# Patient Record
Sex: FEMALE
[De-identification: names, ages, dates, MRNs, and addresses within clinical notes are randomized per-mention and may not be internally consistent; named-entity substitution may affect disease eponyms.]

---

## 2017-06-30 ENCOUNTER — CHARTING TRANS (OUTPATIENT)
Dept: OTHER | Age: 27
End: 2017-06-30

## 2017-06-30 ENCOUNTER — LAB SERVICES (OUTPATIENT)
Dept: OTHER | Age: 27
End: 2017-06-30

## 2017-06-30 LAB
APPEARANCE: CLEAR
BILIRUBIN: NORMAL
COLOR: YELLOW
GLUCOSE U: NORMAL
KETONES: NORMAL
LEUKOCYTES: NORMAL
NITRITE: NORMAL
OCCULT BLOOD: NORMAL
PH: 7.5
PROTEIN: NORMAL
URINE SPEC GRAVITY: 1
UROBILINOGEN: 0.2

## 2017-07-06 LAB — BACTERIA UR CULT: NORMAL

## 2018-07-03 PROCEDURE — 36415 COLL VENOUS BLD VENIPUNCTURE: CPT | Performed by: PHYSICIAN ASSISTANT

## 2018-07-03 PROCEDURE — 86787 VARICELLA-ZOSTER ANTIBODY: CPT | Performed by: PHYSICIAN ASSISTANT

## 2018-07-03 PROCEDURE — 86480 TB TEST CELL IMMUN MEASURE: CPT | Performed by: PHYSICIAN ASSISTANT

## 2018-11-03 VITALS
TEMPERATURE: 98.2 F | OXYGEN SATURATION: 98 % | HEART RATE: 80 BPM | RESPIRATION RATE: 18 BRPM | DIASTOLIC BLOOD PRESSURE: 68 MMHG | SYSTOLIC BLOOD PRESSURE: 110 MMHG

## 2024-02-28 ENCOUNTER — OFFICE VISIT (OUTPATIENT)
Dept: OBGYN CLINIC | Facility: CLINIC | Age: 34
End: 2024-02-28
Payer: COMMERCIAL

## 2024-02-28 VITALS
SYSTOLIC BLOOD PRESSURE: 110 MMHG | BODY MASS INDEX: 20.56 KG/M2 | WEIGHT: 131 LBS | DIASTOLIC BLOOD PRESSURE: 68 MMHG | HEIGHT: 67 IN

## 2024-02-28 DIAGNOSIS — Z34.90 EARLY STAGE OF PREGNANCY (HCC): Primary | ICD-10-CM

## 2024-02-28 LAB
CONTROL LINE PRESENT WITH A CLEAR BACKGROUND (YES/NO): YES YES/NO
KIT LOT #: NORMAL NUMERIC
PREGNANCY TEST, URINE: YES

## 2024-02-28 PROCEDURE — 99203 OFFICE O/P NEW LOW 30 MIN: CPT | Performed by: OBSTETRICS & GYNECOLOGY

## 2024-02-28 RX ORDER — LISDEXAMFETAMINE DIMESYLATE 20 MG/1
CAPSULE ORAL
COMMUNITY
Start: 2024-02-06

## 2024-03-06 ENCOUNTER — PATIENT MESSAGE (OUTPATIENT)
Dept: OBGYN CLINIC | Facility: CLINIC | Age: 34
End: 2024-03-06

## 2024-03-07 NOTE — TELEPHONE ENCOUNTER
From: Pastora Erickson  To: Oxana Lindsay  Sent: 3/6/2024 7:54 PM CST  Subject: Doctors Note for International Travel    Hi Dr. Lindsay (and team) ,     Thank you so much for meeting with me last week. It was so jonathon to have someone chat me through the early pregnancy jitters.     I was hoping you could help me with something. We had international travel scheduled for late July, but we no longer feel comfortable going since we'll be in the first week of the third trimester. The airline will give us a full refund with a doctor's note. I know this probably seems like typical first-time parent anxiety, but I was hoping you'd be willing to write a doctor's note on our behalf recommending we don't fly.    We completely understand if you're unable or unwilling to write the note!    Thank you and talk soon(nell),     Pastora Erickson and Humberto Rock

## 2024-03-15 ENCOUNTER — ROUTINE PRENATAL (OUTPATIENT)
Dept: OBGYN CLINIC | Facility: CLINIC | Age: 34
End: 2024-03-15
Payer: COMMERCIAL

## 2024-03-15 VITALS
WEIGHT: 133 LBS | HEIGHT: 67 IN | BODY MASS INDEX: 20.88 KG/M2 | SYSTOLIC BLOOD PRESSURE: 98 MMHG | DIASTOLIC BLOOD PRESSURE: 58 MMHG

## 2024-03-15 DIAGNOSIS — N91.1 SECONDARY AMENORRHEA: Primary | ICD-10-CM

## 2024-03-15 DIAGNOSIS — Z32.01 PREGNANCY EXAMINATION OR TEST, POSITIVE RESULT (HCC): ICD-10-CM

## 2024-03-15 PROCEDURE — 99214 OFFICE O/P EST MOD 30 MIN: CPT | Performed by: OBSTETRICS & GYNECOLOGY

## 2024-03-15 NOTE — PATIENT INSTRUCTIONS
Adapting to Pregnancy: First Trimester    As your body adjusts, you may have to change or limit your daily activities. You’ll need more rest. You may also need to use the energy you have more wisely.  Your changing body  Almost every part of your body is affected as you adapt to pregnancy. The uterus and cervix will begin to soften right away. You may not look very pregnant during the first 3 months. But you are likely to have some common signs of early pregnancy:  Nausea  Fatigue  Frequent urination  Mood swings  Bloating of the belly  Constipation  Heartburn  Missed or light periods (first trimester bleeding)  Nipple or breast tenderness and breast swelling  It’s not too late to start good habits  What matters most is protecting your baby from this moment on. If you smoke, drink alcohol, or use drugs, now is the time to stop. If you need help, talk with your healthcare provider:  Smoking increases the risk of stillbirth or having a low-birth-weight baby. If you smoke, quit now.  Alcohol and drugs have been linked with miscarriage, birth defects, intellectual disability, and low birth weight. Do not drink alcohol or take drugs.  Tips to relieve nausea  Although nausea can happen at any time of the day, it may be worse in the morning. To help prevent nausea:  Eat small, light meals at frequent intervals.  Drink fluids often.  Get up slowly. Eat a few unsalted crackers before you get out of bed.  Avoid smells that bother you.  Avoid spicy and fatty foods.  Eat an ice pop in your favorite flavor.  Get plenty of rest.  You can start taking karime or mint in all forms or vitamin B6 (maximum 200 mg throughout the day) and Unisom (12.5-25 mg) nightly for nausea and/or vomiting.  Talk with your healthcare provider if you take vitamins that upset your stomach.    Work concerns  The end of the first trimester is a good time to discuss working during pregnancy with your employer. Follow your healthcare provider’s advice if  your job needs you to stand for a long time, work with hazardous tools, or even sit at a desk all day. Your workspace, workload, or scheduled hours may need to be adjusted. Perhaps you can change body postures more often or take an extra break. It is also acceptable to work throughout your pregnancy until delivery.   Advice for travel  Talk to your healthcare provider first, but the second trimester may be the best time for any travel. You may be advised to avoid certain trips while you’re pregnant. Food and water can be concerns in developing countries. Travel by car is a good choice, as you can stop, get out, and stretch. Bring snacks and water along. Fasten the lap belt below your belly, low over your hips. Also be sure to wear the shoulder harness.  Intimacy  Unless your healthcare provider tells you to, there is no reason to stop having sex while you’re pregnant. You or your partner may notice changes in desire. Desire may be less in the first trimester, due to nausea and fatigue. In the second trimester, sex may be very enjoyable. The third trimester can be a challenge comfort-wise. Try different positions and see what’s best for you both.  [employer] last reviewed this educational content on 10/1/2017  © 7666-5044 [employer] [address] [facility], [place] [address]. All rights reserved. This information is not intended as a substitute for professional medical care. Always follow your healthcare professional's instructions.

## 2024-03-15 NOTE — PROGRESS NOTES
[facility]  Obstetrics and Gynecology    Subjective:     Pastora Erickson is a 33 year old  female presents with c/o secondary amenorrhea and positive pregnancy test. The patient was recommended to return for further evaluation. The patient reports some nausea and fatigue. No vomiting. Not using anything yet. Unplanned desired pregnancy.       Patient's last menstrual period was 2024 (approximate).    Recommend against travel internationally in the early 3rd trimester. Letter provided.     Review of Systems:  General: no complaints  Eyes: no complaints  Respiratory: no complaints  Cardiovascular: no complaints  GI: no complaints  : See HPI  Hematological/lymphatic: no complaints  Breast: no complaints  Psychiatric: no complaints  Endocrine:no complaints  Neurological: no complaints  Immunological: no complaints  Musculoskeletal:no complaints    Past Medical History:   Diagnosis Date    ADHD        Past Surgical History:   Procedure Laterality Date    Other surgical history  2016    egg retrival as an egg donor       Social History     Socioeconomic History    Marital status: Single     Spouse name: Not on file    Number of children: Not on file    Years of education: Not on file    Highest education level: Not on file   Occupational History    Not on file   Tobacco Use    Smoking status: Never    Smokeless tobacco: Never   Substance and Sexual Activity    Alcohol use: Not Currently     Comment: cocktail/wine everyday    Drug use: Not Currently     Types: Cannabis    Sexual activity: Yes     Partners: Male     Birth control/protection: Condom   Other Topics Concern     Service Not Asked    Blood Transfusions No    Caffeine Concern Not Asked    Occupational Exposure Not Asked    Hobby Hazards Not Asked    Sleep Concern Not Asked    Stress Concern Not Asked    Weight Concern Not Asked    Special Diet Not Asked    Back Care Not Asked    Exercise Not Asked    Bike Helmet Not Asked     Seat Belt Not Asked    Self-Exams Not Asked   Social History Narrative    Not on file     Social Determinants of Health     Financial Resource Strain: Not on file   Food Insecurity: Not on file   Transportation Needs: Not on file   Stress: Not on file   Housing Stability: Not on file       Family History   Problem Relation Age of Onset    No Known Problems Father     No Known Problems Mother     Stroke Maternal Grandmother     Cancer Maternal Grandmother         unsure of type    Stroke Maternal Grandfather     Cancer Maternal Cousin Female         leukemia    No Known Problems Sister     ADHD Sister     ADHD Brother        Objective:     Vitals:    03/15/24 1508   BP: 98/58   Weight: 133 lb (60.3 kg)   Height: 67\"         Body mass index is 20.83 kg/m².    Exam:   GENERAL: well developed, well nourished, in no apparent distress, alert and orientated X 3  PSYCH: mood and affect stable   SKIN: no rashes, no lesions  LUNGS: respiration unlabored  CARDIOVASCULAR: no peripheral edema or varicosities, skin warm and dry  ABDOMEN: Soft, non distended; non tender.   EXTREMITIES:  Normal range of motion, strength 5/5 walking.     Labs:  POC urine pregnancy test 24:   Positive     Imaging:  Bedside transabdominal ultrasound unable to visualize uterus. Recommend follow up HCG testing and ultrasound when HCG>2000.        Assessment:     Pastora Erickson is a 33 year old  female who presents for secondary amenorrhea and positive pregnancy test    There is no problem list on file for this patient.        Plan:     Secondary amenorrhea  - a/w positive pregnancy test  - US previously ordered. Recommend checking HCG today and in 48 hours. May need to reschedule ultrasound if low HCG.   - HCG ordered for today with repeat .   - Type and screen ordered   - CBC ordered     Patient counseling via instructions   - Pt counseled on safety, diet, exercise, caffiene, tobacco, ETOH, sexual activity, ER precautions, diet,  exercise, appropriate otc medication use, substance abuse   - Counseled on taking a PNV with 0.4mg of folic acid    - advised to follow up to establish prenatal care   - SAB precautions reviewed.    - Nausea and vomiting in pregnancy including vitamin B 6 and unisom reviewed in after visit instructions.     All of the findings and plan were discussed with the patient.  She notes understanding and agrees with the plan of care.  All questions were answered to the best of my ability at this time.    RTC for RN education visit when 10 weeks, New OB visit when 12 weeks or sooner if needed     Dr. Sony Abdi MD    [facility]     This note was created by [employer] voice recognition. Errors in content may be related to improper recognition by the system; efforts to review and correct have been done but errors may still exist. Please be advised the primary purpose of this note is for me to communicate medical care. Standard sentence structure is not always used. Medical terminology and medical abbreviations may be used. There may be grammatical, typographical, and automated fill ins that may have errors missed in proofreading.

## 2024-03-18 ENCOUNTER — ULTRASOUND ENCOUNTER (OUTPATIENT)
Dept: OBGYN CLINIC | Facility: CLINIC | Age: 34
End: 2024-03-18
Payer: COMMERCIAL

## 2024-03-18 DIAGNOSIS — Z34.01 ENCOUNTER FOR SUPERVISION OF NORMAL FIRST PREGNANCY IN FIRST TRIMESTER (HCC): Primary | ICD-10-CM

## 2024-03-20 ENCOUNTER — TELEPHONE (OUTPATIENT)
Dept: OBGYN CLINIC | Facility: CLINIC | Age: 34
End: 2024-03-20
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
## 2024-03-28 ENCOUNTER — TELEPHONE (OUTPATIENT)
Dept: OBGYN CLINIC | Facility: CLINIC | Age: 34
End: 2024-03-28

## 2024-03-28 NOTE — TELEPHONE ENCOUNTER
Patient is calling requesting to speak to RN or Doctor, regarding roller coasters, per patient is on a family trip and wants to know there in-put about roller coaster in her first trimester of pregnancy. Please follow up with patient.

## 2024-04-09 ENCOUNTER — NURSE ONLY (OUTPATIENT)
Dept: OBGYN CLINIC | Facility: CLINIC | Age: 34
End: 2024-04-09
Payer: COMMERCIAL

## 2024-04-09 ENCOUNTER — LAB ENCOUNTER (OUTPATIENT)
Dept: LAB | Facility: REFERENCE LAB | Age: 34
End: 2024-04-09
Attending: INTERNAL MEDICINE
Payer: COMMERCIAL

## 2024-04-09 DIAGNOSIS — Z34.01 ENCOUNTER FOR SUPERVISION OF NORMAL FIRST PREGNANCY IN FIRST TRIMESTER (HCC): Primary | ICD-10-CM

## 2024-04-09 DIAGNOSIS — Z34.01 ENCOUNTER FOR SUPERVISION OF NORMAL FIRST PREGNANCY IN FIRST TRIMESTER (HCC): ICD-10-CM

## 2024-04-09 LAB
ANTIBODY SCREEN: NEGATIVE
BASOPHILS # BLD AUTO: 0.05 X10(3) UL (ref 0–0.2)
BASOPHILS NFR BLD AUTO: 0.4 %
DEPRECATED RDW RBC AUTO: 40.5 FL (ref 35.1–46.3)
EOSINOPHIL # BLD AUTO: 0.12 X10(3) UL (ref 0–0.7)
EOSINOPHIL NFR BLD AUTO: 1 %
ERYTHROCYTE [DISTWIDTH] IN BLOOD BY AUTOMATED COUNT: 12.2 % (ref 11–15)
HBV SURFACE AG SER-ACNC: 0.12 [IU]/L
HBV SURFACE AG SERPL QL IA: NONREACTIVE
HCT VFR BLD AUTO: 37.1 %
HCV AB SERPL QL IA: NONREACTIVE
HGB BLD-MCNC: 13.7 G/DL
IMM GRANULOCYTES # BLD AUTO: 0.04 X10(3) UL (ref 0–1)
IMM GRANULOCYTES NFR BLD: 0.3 %
LYMPHOCYTES # BLD AUTO: 2.43 X10(3) UL (ref 1–4)
LYMPHOCYTES NFR BLD AUTO: 19.9 %
MCH RBC QN AUTO: 33.4 PG (ref 26–34)
MCHC RBC AUTO-ENTMCNC: 36.9 G/DL (ref 31–37)
MCV RBC AUTO: 90.5 FL
MONOCYTES # BLD AUTO: 0.81 X10(3) UL (ref 0.1–1)
MONOCYTES NFR BLD AUTO: 6.6 %
NEUTROPHILS # BLD AUTO: 8.77 X10 (3) UL (ref 1.5–7.7)
NEUTROPHILS # BLD AUTO: 8.77 X10(3) UL (ref 1.5–7.7)
NEUTROPHILS NFR BLD AUTO: 71.8 %
PLATELET # BLD AUTO: 215 10(3)UL (ref 150–450)
RBC # BLD AUTO: 4.1 X10(6)UL
RH BLOOD TYPE: POSITIVE
RUBV IGG SER QL: POSITIVE
RUBV IGG SER-ACNC: 35.4 IU/ML (ref 10–?)
T PALLIDUM AB SER QL IA: NONREACTIVE
WBC # BLD AUTO: 12.2 X10(3) UL (ref 4–11)

## 2024-04-09 PROCEDURE — 86803 HEPATITIS C AB TEST: CPT

## 2024-04-09 PROCEDURE — 86850 RBC ANTIBODY SCREEN: CPT

## 2024-04-09 PROCEDURE — 87340 HEPATITIS B SURFACE AG IA: CPT

## 2024-04-09 PROCEDURE — 83021 HEMOGLOBIN CHROMOTOGRAPHY: CPT

## 2024-04-09 PROCEDURE — 86780 TREPONEMA PALLIDUM: CPT

## 2024-04-09 PROCEDURE — 83020 HEMOGLOBIN ELECTROPHORESIS: CPT

## 2024-04-09 PROCEDURE — 87389 HIV-1 AG W/HIV-1&-2 AB AG IA: CPT

## 2024-04-09 PROCEDURE — 86762 RUBELLA ANTIBODY: CPT

## 2024-04-09 PROCEDURE — 86901 BLOOD TYPING SEROLOGIC RH(D): CPT

## 2024-04-09 PROCEDURE — 87086 URINE CULTURE/COLONY COUNT: CPT

## 2024-04-09 PROCEDURE — 85025 COMPLETE CBC W/AUTO DIFF WBC: CPT

## 2024-04-09 PROCEDURE — 86900 BLOOD TYPING SEROLOGIC ABO: CPT

## 2024-04-09 PROCEDURE — 36415 COLL VENOUS BLD VENIPUNCTURE: CPT

## 2024-04-09 NOTE — PROGRESS NOTES
Pt is a   here today for RN OB Education.       Pregnancy Confirmation apt with:  with RD    LMP:     US: 3/18 (8w2d)    Working MARIA DE JESUS: 10/22    Pre  BMI: 19.89    Medical Hx significant for: ADHD    Obstetrical Hx significant for: NA    Surgical Hx significant for: egg retrieval (egg donor)    EPDS score: 5    Early GTT screening: does not meet criteria    Preeclampsia prevention screening: does not meet criteria.     OUD Screening: Patient has answered NO to 5p questions and has no  risk factors.     Patient given \"What Pregnant Women Need to Know\" handout.     Educational material reviewed with patient: Prenatal care, nutrition, weight gain recommendations, travel, exercise, intercourse, pregnancy changes, safe medications, pregnancy and work, fetal movement, labor and  labor, warning signs, food safety, tdap, cord blood, breastfeeding, circumcision, and Group B strep.     Pt agrees to blood transfusion if needed: Yes    PN labs ordered: Yes    Optional genetic screening discussed.  Pt desires foresight and prequel.    [facility] Media Policy: Reviewed and verbalized understanding.     NOB appt:  with [facility] (earliest available that worked with pt's skdevan)    Lab appt:

## 2024-04-10 LAB
HGB A2 MFR BLD: 2.7 % (ref 1.5–3.5)
HGB PNL BLD ELPH: 97.3 % (ref 95.5–100)

## 2024-04-16 ENCOUNTER — TELEPHONE (OUTPATIENT)
Dept: OBGYN CLINIC | Facility: CLINIC | Age: 34
End: 2024-04-16

## 2024-04-16 PROBLEM — Z33.1 LOW RISK PREGNANCY, INCIDENTAL (HCC): Status: ACTIVE | Noted: 2024-04-16

## 2024-04-16 NOTE — TELEPHONE ENCOUNTER
RN spoke with pt, verified name and . RN provided pt with neg/normal Prequel results and female gender. Pt verbalized understanding and agreed with POC.

## 2024-04-22 ENCOUNTER — TELEPHONE (OUTPATIENT)
Dept: OBGYN CLINIC | Facility: CLINIC | Age: 34
End: 2024-04-22

## 2024-04-22 ENCOUNTER — INITIAL PRENATAL (OUTPATIENT)
Dept: OBGYN CLINIC | Facility: CLINIC | Age: 34
End: 2024-04-22
Payer: COMMERCIAL

## 2024-04-22 VITALS
SYSTOLIC BLOOD PRESSURE: 108 MMHG | DIASTOLIC BLOOD PRESSURE: 60 MMHG | BODY MASS INDEX: 21.77 KG/M2 | WEIGHT: 138.69 LBS | HEART RATE: 96 BPM | HEIGHT: 67 IN

## 2024-04-22 DIAGNOSIS — Z34.02 ENCOUNTER FOR SUPERVISION OF NORMAL FIRST PREGNANCY IN SECOND TRIMESTER (HCC): Primary | ICD-10-CM

## 2024-04-22 PROBLEM — Z14.8 GENETIC CARRIER STATUS: Status: ACTIVE | Noted: 2024-04-22

## 2024-04-22 NOTE — PROGRESS NOTES
[facility]  Obstetrics and Gynecology  History & Physical    CC: Patient is here to establish prenatal care     Subjective:     HPI:  Pastoar Erickson is a 33 year old  female at 13w6d who presents today to establish prenatal care. Patient reports no nausea or vomiting. No cramping or bleeding.     LMP: Patient's last menstrual period was 2024 (approximate).  MARIA DE JESUS:  Estimated Date of Delivery: 10/22/24    OB:  OB History    Para Term  AB Living   1 0 0 0 0 0   SAB IAB Ectopic Multiple Live Births   0 0 0 0        # Outcome Date GA Lbr Mike/2nd Weight Sex Type Anes PTL Lv   1 Current                GYN:   Pap hx- Last Pap 23: NILM.    PMH:   Past Medical History:    ADHD       PSH:    Past Surgical History:   Procedure Laterality Date    Other surgical history      egg retrival as an egg donor       MEDS:  Current Outpatient Medications on File Prior to Visit   Medication Sig Dispense Refill    VYVANSE 20 MG Oral Cap       Prenatal Vit-Fe Fumarate-FA (PRENATAL 19 OR) Take by mouth.      Multiple Vitamins-Minerals (MULTIVITAMIN ADULT OR) Take by mouth.       No current facility-administered medications on file prior to visit.       Allergies:    Allergies   Allergen Reactions    Penicillins RASH     rash     Immunizations:  Immunization History   Administered Date(s) Administered    DTAP 10/06/1992, 1992, 1993, 1997, 1999    HEP B 1997, 10/30/2007, 2008    HPV (Gardasil) 10/12/2006, 10/30/2007, 2008    Hep A, Adult 2018    IPV 10/06/1992, 1992, 1994, 1997    MMR 1994, 2007    Meningococcal-Menactra 2009    TDAP 2007, 2018       Family Hx:      Family History   Problem Relation Age of Onset    No Known Problems Father     No Known Problems Mother     Cancer Maternal Grandmother         unsure of type    Stroke Maternal Grandfather     No Known Problems Paternal Grandmother      No Known Problems Paternal Grandfather     No Known Problems Sister     ADHD Sister     ADHD Brother     Cancer Maternal Cousin Female         leukemia       SocialHx:        Social History     Socioeconomic History    Marital status: Single   Tobacco Use    Smoking status: Never    Smokeless tobacco: Never   Substance and Sexual Activity    Alcohol use: Not Currently     Comment: cocktail/wine everyday    Drug use: Not Currently     Types: Cannabis    Sexual activity: Yes     Partners: Male     Birth control/protection: Condom   Other Topics Concern    Blood Transfusions No     Social Determinants of Health     Financial Resource Strain: Low Risk  (4/8/2024)    Financial Resource Strain     Difficulty of Paying Living Expenses: Somewhat hard     Med Affordability: No   Transportation Needs: Unmet Transportation Needs (4/8/2024)    Transportation Needs     Lack of Transportation: Yes   Stress: No Stress Concern Present (4/8/2024)    Stress     Feeling of Stress : No   Housing Stability: Low Risk  (4/8/2024)    Housing Stability     Housing Instability: No   Review of Systems:  General: no complaints  Eyes: no complaints  Respiratory: no complaints  Cardiovascular: no complaints  GI: no complaints  : See HPI  Hematological/lymphatic: no complaints  Breast: no complaints  Psychiatric: no complaints  Endocrine:no complaints  Neurological: no complaints  Immunological: no complaints  Musculoskeletal:no complaints    Objective:     Vitals:    04/22/24 1341   BP: 108/60   Pulse: 96   Weight: 138 lb 11.2 oz (62.9 kg)   Height: 67\"        Exam:   GENERAL: well developed, well nourished, in no apparent distress, alert and orientated X 3  PSYCH: mood and affect stable   SKIN: no rashes, no lesions  HEENT: normal  LUNGS: respiration unlabored  CARDIOVASCULAR: no peripheral edema or varicosities, skin warm and dry  ABDOMEN: Soft, non distended; non tender.  EXTREMITIES:  Normal range of motion, strength 5/5 walking,  nontender without edema    Fetal Well Being Assessment:    Bedside doppler Mode 's.    Assessment/Plan:     Pastora Erickson is a 33 year old  female at 13w6d who presents today to establish prenatal care.    Patient Active Problem List   Diagnosis    Low-risk cell-free DNA, female ()    Genetic carrier status         Prenatal care  - prenatal labs ordered  - Pap: up to date.   - continue PNV daily  - PAULETTE at 15 weeks for MSAFP testing.     Genetic Screening tests  - Discussion held with patient about genetic screening: Cell free DNA and amniocentesis.  - Low risk cell free DNA baby girl. Patient aware.   - Myriad Carrier screening including Cystic fibrosis, SMA, and hemoglobinopathies: positive. Recommend partner testing. Patient agreed.        Patient education  - Pt counseled on safety, diet, exercise, caffiene, tobacco, ETOH, sexual activity, ER precautions, diet, exercise, appropriate weight gain, travel, appropriate otc medication use, substance abuse and pets.  - Counseled on taking a PNV with 0.4mg of folic acid   - Limiting intake of alcohol, coffee, tea, soda, and other foods containing caffeine  - Limit intake of fish to twice weekly to limit mercury exposure    RTC in 2 weeks     Dr. Sony Abdi MD    [facility]     This note was created by [employer] voice recognition. Errors in content may be related to improper recognition by the system; efforts to review and correct have been done but errors may still exist. Please be advised the primary purpose of this note is for me to communicate medical care. Standard sentence structure is not always used. Medical terminology and medical abbreviations may be used. There may be grammatical, typographical, and automated fill ins that may have errors missed in proofreading.

## 2024-04-22 NOTE — TELEPHONE ENCOUNTER
+ Hereditary Fructose Intolerance    Called pt informed, needing partner testing and report sent to pt's email via [email or web address].  Pt agrees.

## 2024-05-01 ENCOUNTER — TELEPHONE (OUTPATIENT)
Dept: OBGYN CLINIC | Facility: CLINIC | Age: 34
End: 2024-05-01

## 2024-05-01 NOTE — TELEPHONE ENCOUNTER
Patient states she has been having upper abdomen pain accompanied by a headache that started around 4am this morning. Would like to speak to a nurse.

## 2024-05-01 NOTE — TELEPHONE ENCOUNTER
Called pt c/o headache does not go away and upper abd pain. Reviewed chart Pt at 15.1 weeks, BP normal.  Per pt took one tylenol and strength unknown.  Informed pt may take two tablets of regular every 6 hours and 2 tabs of extra strength eduar 8 hours do not exceed 3,000 mg in 24 hours.      C/o dull to sharp pain across abd with walking sharper pain, did pass a bit of gas this am, + BM, PS 5-6/10 with walking but when lays down gets better, denies fever.  Informed pt to rest, can take Gas X and call back if getting worse with abd and headache.  Pt agrees.

## 2024-05-08 ENCOUNTER — ROUTINE PRENATAL (OUTPATIENT)
Dept: OBGYN CLINIC | Facility: CLINIC | Age: 34
End: 2024-05-08
Payer: COMMERCIAL

## 2024-05-08 ENCOUNTER — LAB ENCOUNTER (OUTPATIENT)
Dept: LAB | Facility: REFERENCE LAB | Age: 34
End: 2024-05-08
Attending: OBSTETRICS & GYNECOLOGY
Payer: COMMERCIAL

## 2024-05-08 VITALS
BODY MASS INDEX: 21.77 KG/M2 | HEIGHT: 67 IN | WEIGHT: 138.69 LBS | SYSTOLIC BLOOD PRESSURE: 104 MMHG | DIASTOLIC BLOOD PRESSURE: 62 MMHG

## 2024-05-08 DIAGNOSIS — Z34.02 ENCOUNTER FOR SUPERVISION OF NORMAL FIRST PREGNANCY IN SECOND TRIMESTER (HCC): ICD-10-CM

## 2024-05-08 DIAGNOSIS — Z34.02 ENCOUNTER FOR SUPERVISION OF NORMAL FIRST PREGNANCY IN SECOND TRIMESTER (HCC): Primary | ICD-10-CM

## 2024-05-08 PROCEDURE — 82105 ALPHA-FETOPROTEIN SERUM: CPT

## 2024-05-08 PROCEDURE — 36415 COLL VENOUS BLD VENIPUNCTURE: CPT

## 2024-05-08 NOTE — PROGRESS NOTES
34 yo  at 16 W  AFP and usn ordered.   + c/o 4 - 5 days of HA which is gone today. Using 2 - 3 tylenol per day. No previous hx of HA. Recommend FU with PCP is recurs.   No FM.   She is concerned about weight gain, but dw pt it is normal.

## 2024-05-11 LAB
AFP MOM: 1.05
AFP VALUE: 38.5 NG/ML
GA ON COLL DATE: 16 WEEKS
INSULIN DEP AFP: NO
MAT AGE AT EDD: 32.2 YR
MULTIPLE GEST AFP: NO
OSBR RISK 1 IN AFP: NORMAL
WEIGHT AFP: 132 LBS

## 2024-05-16 ENCOUNTER — TELEPHONE (OUTPATIENT)
Dept: OBGYN CLINIC | Facility: CLINIC | Age: 34
End: 2024-05-16

## 2024-05-16 NOTE — TELEPHONE ENCOUNTER
Partner negative for Hereditary Fructose intolerance    Called pt and informed of negative partner testing.  Pt agrees.

## 2024-06-05 ENCOUNTER — ULTRASOUND ENCOUNTER (OUTPATIENT)
Dept: OBGYN CLINIC | Facility: CLINIC | Age: 34
End: 2024-06-05
Payer: COMMERCIAL

## 2024-06-05 ENCOUNTER — ROUTINE PRENATAL (OUTPATIENT)
Dept: OBGYN CLINIC | Facility: CLINIC | Age: 34
End: 2024-06-05
Payer: COMMERCIAL

## 2024-06-05 VITALS
BODY MASS INDEX: 24.77 KG/M2 | SYSTOLIC BLOOD PRESSURE: 110 MMHG | WEIGHT: 157.81 LBS | HEIGHT: 67 IN | DIASTOLIC BLOOD PRESSURE: 64 MMHG

## 2024-06-05 DIAGNOSIS — Z34.02 ENCOUNTER FOR SUPERVISION OF NORMAL FIRST PREGNANCY IN SECOND TRIMESTER (HCC): Primary | ICD-10-CM

## 2024-06-05 DIAGNOSIS — Z34.00 ENCNTR FOR SUPRVSN OF NORMAL FIRST PREGNANCY, UNSP TRIMESTER (HCC): ICD-10-CM

## 2024-06-05 DIAGNOSIS — O28.3 ABNORMAL ULTRASONIC FINDING ON ANTENATAL SCREENING OF MOTHER, ANTEPARTUM: ICD-10-CM

## 2024-06-05 NOTE — PROGRESS NOTES
Denies pain, bleeding, LOF.   33 year old  at 20w1d by LMP     Return OB  Pre- Care: UTD. Anatomy ultrasound reviewed today - borderline fetal pylectasis. Reviewed with patient and partner. Level 2 ultrasound ordered - to schedule 4-6 weeks.    Patient Active Problem List   Diagnosis    Genetic carrier  - +Hereditary Fructose Intolerance- partner testing negative    Abnormal ultrasonic finding on  screening of mother, antepartum - fetal pyelectasis    Encntr for suprvsn of normal first pregnancy, unsp trimester (HCC)       - Return to clinic in: 4

## 2024-07-03 ENCOUNTER — ROUTINE PRENATAL (OUTPATIENT)
Dept: OBGYN CLINIC | Facility: CLINIC | Age: 34
End: 2024-07-03
Payer: COMMERCIAL

## 2024-07-03 ENCOUNTER — PATIENT MESSAGE (OUTPATIENT)
Dept: OBGYN CLINIC | Facility: CLINIC | Age: 34
End: 2024-07-03

## 2024-07-03 ENCOUNTER — LAB ENCOUNTER (OUTPATIENT)
Dept: LAB | Age: 34
End: 2024-07-03
Attending: OBSTETRICS & GYNECOLOGY
Payer: COMMERCIAL

## 2024-07-03 VITALS
HEIGHT: 67 IN | WEIGHT: 151.19 LBS | BODY MASS INDEX: 23.73 KG/M2 | DIASTOLIC BLOOD PRESSURE: 66 MMHG | SYSTOLIC BLOOD PRESSURE: 112 MMHG

## 2024-07-03 DIAGNOSIS — Z34.02 ENCOUNTER FOR SUPERVISION OF NORMAL FIRST PREGNANCY IN SECOND TRIMESTER (HCC): ICD-10-CM

## 2024-07-03 DIAGNOSIS — Z36.9 UNSPECIFIED ANTENATAL SCREENING (HCC): Primary | ICD-10-CM

## 2024-07-03 LAB
BASOPHILS # BLD AUTO: 0.06 X10(3) UL (ref 0–0.2)
BASOPHILS NFR BLD AUTO: 0.6 %
DEPRECATED RDW RBC AUTO: 45.1 FL (ref 35.1–46.3)
EOSINOPHIL # BLD AUTO: 0.14 X10(3) UL (ref 0–0.7)
EOSINOPHIL NFR BLD AUTO: 1.5 %
ERYTHROCYTE [DISTWIDTH] IN BLOOD BY AUTOMATED COUNT: 13.1 % (ref 11–15)
GLUCOSE 1H P GLC SERPL-MCNC: 79 MG/DL
HCT VFR BLD AUTO: 33.9 %
HGB BLD-MCNC: 12 G/DL
IMM GRANULOCYTES # BLD AUTO: 0.09 X10(3) UL (ref 0–1)
IMM GRANULOCYTES NFR BLD: 1 %
LYMPHOCYTES # BLD AUTO: 1.56 X10(3) UL (ref 1–4)
LYMPHOCYTES NFR BLD AUTO: 16.8 %
MCH RBC QN AUTO: 34.1 PG (ref 26–34)
MCHC RBC AUTO-ENTMCNC: 35.4 G/DL (ref 31–37)
MCV RBC AUTO: 96.3 FL
MONOCYTES # BLD AUTO: 0.82 X10(3) UL (ref 0.1–1)
MONOCYTES NFR BLD AUTO: 8.8 %
NEUTROPHILS # BLD AUTO: 6.62 X10 (3) UL (ref 1.5–7.7)
NEUTROPHILS # BLD AUTO: 6.62 X10(3) UL (ref 1.5–7.7)
NEUTROPHILS NFR BLD AUTO: 71.3 %
PLATELET # BLD AUTO: 192 10(3)UL (ref 150–450)
RBC # BLD AUTO: 3.52 X10(6)UL
WBC # BLD AUTO: 9.3 X10(3) UL (ref 4–11)

## 2024-07-03 PROCEDURE — 36415 COLL VENOUS BLD VENIPUNCTURE: CPT

## 2024-07-03 PROCEDURE — 82950 GLUCOSE TEST: CPT

## 2024-07-03 PROCEDURE — 85025 COMPLETE CBC W/AUTO DIFF WBC: CPT

## 2024-07-03 RX ORDER — ACETAMINOPHEN/DIPHENHYDRAMINE 500MG-25MG
TABLET ORAL
COMMUNITY

## 2024-07-03 NOTE — PROGRESS NOTES
Doing well. No OB complaints. +FM.   1h GTT today.   Reviewed baby mindfulness for fetal movement monitoring at 28 weeks.   Has follow up MFM ultrasound scheduled due to bilateral renal ptyalectasis on ultrasound.     PAULETTE 4 weeks.     Dr. Sony Abdi MD    [facility]     This note was created by [employer] voice recognition. Errors in content may be related to improper recognition by the system; efforts to review and correct have been done but errors may still exist. Please be advised the primary purpose of this note is for me to communicate medical care. Standard sentence structure is not always used. Medical terminology and medical abbreviations may be used. There may be grammatical, typographical, and automated fill ins that may have errors missed in proofreading.

## 2024-07-03 NOTE — TELEPHONE ENCOUNTER
From: Pastora Erickson  To: Corrina Crouch  Sent: 7/3/2024 3:34 PM CDT  Subject: CBC w/ Differential Result    Hi Dr. Crouch,     I just saw my CBC w/ Differential results. A few were out of normal range (or close to it). Is there any supplement or dietary focus I should be incorporating that would help me get back into normal range?    Thanks!

## 2024-08-06 ENCOUNTER — PATIENT MESSAGE (OUTPATIENT)
Dept: OBGYN CLINIC | Facility: CLINIC | Age: 34
End: 2024-08-06

## 2024-08-06 NOTE — TELEPHONE ENCOUNTER
From: Pastora Erickson  To: Oxana Lindsay  Sent: 8/6/2024 7:32 AM CDT  Subject: Scheduling Next Visit    Hello,     Last week, I completed my high risk fetal medicine ultra sound. The doctor said that that the baby was not likely at risk and recommended I did not need additional monitoring with his offfice.     I don't have any more appointments scheduled with your office. Now that i'm in the third trimester, how often should I schedule my appointments and please let me know if there's anything I should tell the schedulers (request for additional labs/tests, etc.)?

## 2024-08-09 ENCOUNTER — TELEPHONE (OUTPATIENT)
Dept: OBGYN CLINIC | Facility: CLINIC | Age: 34
End: 2024-08-09

## 2024-08-09 NOTE — TELEPHONE ENCOUNTER
Received via Fax from [employer]   [facility] to be completed .  No AHMET was received or payment .  Sending via email and original to the forms department .

## 2024-08-13 ENCOUNTER — ROUTINE PRENATAL (OUTPATIENT)
Dept: OBGYN CLINIC | Facility: CLINIC | Age: 34
End: 2024-08-13
Payer: COMMERCIAL

## 2024-08-13 VITALS
BODY MASS INDEX: 25.01 KG/M2 | WEIGHT: 159.38 LBS | DIASTOLIC BLOOD PRESSURE: 64 MMHG | SYSTOLIC BLOOD PRESSURE: 110 MMHG | HEIGHT: 67 IN

## 2024-08-13 DIAGNOSIS — Z34.00 ENCNTR FOR SUPRVSN OF NORMAL FIRST PREGNANCY, UNSP TRIMESTER (HCC): Primary | ICD-10-CM

## 2024-08-13 PROCEDURE — 90471 IMMUNIZATION ADMIN: CPT | Performed by: OBSTETRICS & GYNECOLOGY

## 2024-08-13 PROCEDURE — 90715 TDAP VACCINE 7 YRS/> IM: CPT | Performed by: OBSTETRICS & GYNECOLOGY

## 2024-08-13 NOTE — PROGRESS NOTES
Denies pain, bleeding, LOF.   Has had difficulty with sleeping lately - has tried magnesium, melatonin, Tylenol PM    34 year old  at 30w0d by LMP     Return OB  Pre- Care: UTD.  Tdap today  Repeat ultrasound with MFM - stable mild pylectiasis    Patient Active Problem List   Diagnosis    Genetic carrier  - +Hereditary Fructose Intolerance- partner testing negative    Abnormal ultrasonic finding on  screening of mother, antepartum - fetal pyelectasis    Encntr for suprvsn of normal first pregnancy, unsp trimester (HCC)       - Return to clinic in: 2

## 2024-08-14 ENCOUNTER — TELEPHONE (OUTPATIENT)
Dept: OBGYN CLINIC | Facility: CLINIC | Age: 34
End: 2024-08-14

## 2024-08-14 NOTE — TELEPHONE ENCOUNTER
Received via Fax from [employer]  [facility] forms  to be completed .  No ROIor payment has been made .  Forms has been email and sending original to the Forms Department

## 2024-08-16 NOTE — TELEPHONE ENCOUNTER
Family Medical Leave Act forms received in forms dept. Logged for processing. [employer] message sent for completion of Release of Information.

## 2024-08-20 NOTE — TELEPHONE ENCOUNTER
Dr. Lindsay,     *The ACKNOWLEDGE button has been moved to the top right ribbon*    Please sign off on form if you agree to: Family Medical Leave Act, maternity leave. Start date on or near MARIA DE JESUS 10/22/24-6wks vaginal/8wks . Patient may take up to 12 wks to bond/care for . Patient may start leave at 38wks.  (place your signature on the first page only)    -From your Inbasket, Highlight the patient and click Chart   -Double click the 2024 Forms Completion telephone encounter  -Scroll down to the Media section   -Click the blue Hyperlink: Family Medical Leave Act Dr Lindsay 24  -Click Acknowledge located in the top right ribbon/menu   -Drag the mouse into the blank space of the document and a + sign will appear. Left click to   electronically sign the document.     Thank you,    Yasmin

## 2024-08-22 NOTE — TELEPHONE ENCOUNTER
Family Medical Leave Act forms completed and uploaded to patient's mychart. No authorization on file.

## 2024-08-29 ENCOUNTER — ROUTINE PRENATAL (OUTPATIENT)
Dept: OBGYN CLINIC | Facility: CLINIC | Age: 34
End: 2024-08-29
Payer: COMMERCIAL

## 2024-08-29 VITALS
HEIGHT: 67 IN | DIASTOLIC BLOOD PRESSURE: 70 MMHG | SYSTOLIC BLOOD PRESSURE: 112 MMHG | WEIGHT: 158.69 LBS | BODY MASS INDEX: 24.91 KG/M2

## 2024-08-29 DIAGNOSIS — R10.2 PERINEAL PAIN: Primary | ICD-10-CM

## 2024-08-29 NOTE — PROGRESS NOTES
Denies pain, bleeding, LOF.   Has had difficulty with sleeping lately - has tried magnesium, melatonin, Tylenol PM    34 year old  at 32w2d by LMP     Return OB  Pre- Care: UTD.  Tdap today  Repeat ultrasound with MFM - stable mild pylectiasis  at 28 weeks  Confirm presentation in next couple visits.     Patient Active Problem List   Diagnosis    Genetic carrier  - +Hereditary Fructose Intolerance- partner testing negative    Abnormal ultrasonic finding on  screening of mother, antepartum - fetal pyelectasis    Encntr for suprvsn of normal first pregnancy, unsp trimester (HCC)       - Return to clinic in: 2

## 2024-09-11 ENCOUNTER — APPOINTMENT (OUTPATIENT)
Dept: PHYSICAL THERAPY | Age: 34
End: 2024-09-11
Attending: OBSTETRICS & GYNECOLOGY
Payer: COMMERCIAL

## 2024-09-16 ENCOUNTER — ROUTINE PRENATAL (OUTPATIENT)
Dept: OBGYN CLINIC | Facility: CLINIC | Age: 34
End: 2024-09-16
Payer: COMMERCIAL

## 2024-09-16 VITALS
BODY MASS INDEX: 25.71 KG/M2 | DIASTOLIC BLOOD PRESSURE: 70 MMHG | WEIGHT: 163.81 LBS | HEIGHT: 67 IN | SYSTOLIC BLOOD PRESSURE: 124 MMHG

## 2024-09-16 DIAGNOSIS — Z34.00 ENCNTR FOR SUPRVSN OF NORMAL FIRST PREGNANCY, UNSP TRIMESTER (HCC): Primary | ICD-10-CM

## 2024-09-17 ENCOUNTER — APPOINTMENT (OUTPATIENT)
Dept: PHYSICAL THERAPY | Age: 34
End: 2024-09-17
Attending: OBSTETRICS & GYNECOLOGY
Payer: COMMERCIAL

## 2024-09-23 ENCOUNTER — ROUTINE PRENATAL (OUTPATIENT)
Dept: OBGYN CLINIC | Facility: CLINIC | Age: 34
End: 2024-09-23
Payer: COMMERCIAL

## 2024-09-23 VITALS
WEIGHT: 166 LBS | SYSTOLIC BLOOD PRESSURE: 118 MMHG | BODY MASS INDEX: 26.06 KG/M2 | HEIGHT: 67 IN | DIASTOLIC BLOOD PRESSURE: 64 MMHG

## 2024-09-23 DIAGNOSIS — Z34.02 ENCOUNTER FOR SUPERVISION OF NORMAL FIRST PREGNANCY IN SECOND TRIMESTER (HCC): Primary | ICD-10-CM

## 2024-09-23 NOTE — PROGRESS NOTES
34 year old  at 35w6d     Contractions: No  VB: No  LOF: No  Fetal movement: Yes    Return OB  Pre-Katy Care: UTD.   - third tri labs ordered for next week  - plan for GBS next week  - RSV vaccine today  - vertex by BSUS today  Patient Active Problem List   Diagnosis    Genetic carrier  - +Hereditary Fructose Intolerance- partner testing negative    Abnormal ultrasonic finding on  screening of mother, antepartum - fetal pyelectasis    Encntr for suprvsn of normal first pregnancy, unsp trimester (HCC)       - Return to clinic in: 1 week    Oxana Lindsay DO

## 2024-09-24 ENCOUNTER — APPOINTMENT (OUTPATIENT)
Dept: PHYSICAL THERAPY | Age: 34
End: 2024-09-24
Attending: OBSTETRICS & GYNECOLOGY
Payer: COMMERCIAL

## 2024-09-30 ENCOUNTER — ROUTINE PRENATAL (OUTPATIENT)
Dept: OBGYN CLINIC | Facility: CLINIC | Age: 34
End: 2024-09-30
Payer: COMMERCIAL

## 2024-09-30 VITALS
HEIGHT: 67 IN | WEIGHT: 166.5 LBS | DIASTOLIC BLOOD PRESSURE: 64 MMHG | SYSTOLIC BLOOD PRESSURE: 112 MMHG | BODY MASS INDEX: 26.13 KG/M2

## 2024-09-30 DIAGNOSIS — Z34.00 ENCNTR FOR SUPRVSN OF NORMAL FIRST PREGNANCY, UNSP TRIMESTER (HCC): Primary | ICD-10-CM

## 2024-09-30 PROCEDURE — 87150 DNA/RNA AMPLIFIED PROBE: CPT | Performed by: OBSTETRICS & GYNECOLOGY

## 2024-09-30 PROCEDURE — 87081 CULTURE SCREEN ONLY: CPT | Performed by: OBSTETRICS & GYNECOLOGY

## 2024-09-30 NOTE — PROGRESS NOTES
34 year old  at 36w6d     Contractions: No  VB: No  LOF: No  Fetal movement: Yes    Return OB  Pre-Katy Care: UTD. Third tri labs today  - GBS sent.  - Flu vax today  Patient Active Problem List   Diagnosis    Genetic carrier  - +Hereditary Fructose Intolerance- partner testing negative    Abnormal ultrasonic finding on  screening of mother, antepartum - fetal pyelectasis    Encntr for suprvsn of normal first pregnancy, unsp trimester (HCC)       - Return to clinic in: 1 week    Corrina Crouch MD

## 2024-10-01 LAB — GROUP B STREP BY PCR FOR PCR OVT: NEGATIVE

## 2024-10-02 ENCOUNTER — APPOINTMENT (OUTPATIENT)
Dept: PHYSICAL THERAPY | Age: 34
End: 2024-10-02
Attending: OBSTETRICS & GYNECOLOGY
Payer: COMMERCIAL

## 2024-10-08 ENCOUNTER — APPOINTMENT (OUTPATIENT)
Dept: PHYSICAL THERAPY | Age: 34
End: 2024-10-08
Attending: OBSTETRICS & GYNECOLOGY
Payer: COMMERCIAL

## 2024-10-10 ENCOUNTER — LAB ENCOUNTER (OUTPATIENT)
Dept: LAB | Age: 34
End: 2024-10-10
Attending: OBSTETRICS & GYNECOLOGY
Payer: COMMERCIAL

## 2024-10-10 ENCOUNTER — ROUTINE PRENATAL (OUTPATIENT)
Dept: OBGYN CLINIC | Facility: CLINIC | Age: 34
End: 2024-10-10
Payer: COMMERCIAL

## 2024-10-10 VITALS
HEIGHT: 67 IN | BODY MASS INDEX: 26.21 KG/M2 | SYSTOLIC BLOOD PRESSURE: 124 MMHG | WEIGHT: 167 LBS | DIASTOLIC BLOOD PRESSURE: 68 MMHG

## 2024-10-10 DIAGNOSIS — Z34.00 ENCNTR FOR SUPRVSN OF NORMAL FIRST PREGNANCY, UNSP TRIMESTER (HCC): Primary | ICD-10-CM

## 2024-10-10 DIAGNOSIS — Z34.02 ENCOUNTER FOR SUPERVISION OF NORMAL FIRST PREGNANCY IN SECOND TRIMESTER (HCC): ICD-10-CM

## 2024-10-10 LAB
DEPRECATED RDW RBC AUTO: 41.9 FL (ref 35.1–46.3)
ERYTHROCYTE [DISTWIDTH] IN BLOOD BY AUTOMATED COUNT: 12.1 % (ref 11–15)
HCT VFR BLD AUTO: 36 %
HGB BLD-MCNC: 12.6 G/DL
MCH RBC QN AUTO: 33.1 PG (ref 26–34)
MCHC RBC AUTO-ENTMCNC: 35 G/DL (ref 31–37)
MCV RBC AUTO: 94.5 FL
PLATELET # BLD AUTO: 176 10(3)UL (ref 150–450)
RBC # BLD AUTO: 3.81 X10(6)UL
T PALLIDUM AB SER QL IA: NONREACTIVE
WBC # BLD AUTO: 12.2 X10(3) UL (ref 4–11)

## 2024-10-10 PROCEDURE — 87389 HIV-1 AG W/HIV-1&-2 AB AG IA: CPT

## 2024-10-10 PROCEDURE — 86780 TREPONEMA PALLIDUM: CPT

## 2024-10-10 PROCEDURE — 36415 COLL VENOUS BLD VENIPUNCTURE: CPT

## 2024-10-10 PROCEDURE — 85027 COMPLETE CBC AUTOMATED: CPT

## 2024-10-10 NOTE — PROGRESS NOTES
34 year old  at 38w2d     Contractions: No  VB: No  LOF: No  Fetal movement: Yes    Return OB  Pre- Care: UTD  - labor signs reviewed    Patient Active Problem List   Diagnosis    Genetic carrier  - +Hereditary Fructose Intolerance- partner testing negative    Abnormal ultrasonic finding on  screening of mother, antepartum - fetal pyelectasis    Encntr for suprvsn of normal first pregnancy, unsp trimester (HCC)       - Return to clinic in: 1 week    Oxana Lindsay DO

## 2024-10-13 ENCOUNTER — APPOINTMENT (OUTPATIENT)
Dept: ULTRASOUND IMAGING | Facility: HOSPITAL | Age: 34
End: 2024-10-13
Attending: OBSTETRICS & GYNECOLOGY
Payer: COMMERCIAL

## 2024-10-13 ENCOUNTER — HOSPITAL ENCOUNTER (OUTPATIENT)
Facility: HOSPITAL | Age: 34
Discharge: HOME | End: 2024-10-13
Attending: OBSTETRICS & GYNECOLOGY | Admitting: OBSTETRICS & GYNECOLOGY
Payer: COMMERCIAL

## 2024-10-13 VITALS — DIASTOLIC BLOOD PRESSURE: 69 MMHG | SYSTOLIC BLOOD PRESSURE: 122 MMHG | HEART RATE: 84 BPM

## 2024-10-13 PROCEDURE — 99214 OFFICE O/P EST MOD 30 MIN: CPT

## 2024-10-13 PROCEDURE — 76818 FETAL BIOPHYS PROFILE W/NST: CPT | Performed by: OBSTETRICS & GYNECOLOGY

## 2024-10-13 PROCEDURE — 76819 FETAL BIOPHYS PROFIL W/O NST: CPT | Performed by: OBSTETRICS & GYNECOLOGY

## 2024-10-13 PROCEDURE — 59025 FETAL NON-STRESS TEST: CPT

## 2024-10-13 NOTE — PROGRESS NOTES
Pt is a 34 year old female admitted to TR2/TR2-A.     Chief Complaint   Patient presents with    R/o Rom    Decreased Fetal Movement      Pt is  38w5d intra-uterine pregnancy.  History obtained, consents signed. Oriented to room, staff, and plan of care.

## 2024-10-13 NOTE — TRIAGE
[facility]  part of [facility]      Triage Note    Pastora Erickson Patient Status:  Outpatient    1990 MRN [id number]   Location [facility] Attending Sony Abdi MD   Hosp Day # 0 PCP No primary care provider on file.      Para:   Estimated Date of Delivery: 10/22/24  Gestation: 38w5d    Chief Complaint    R/o Rom; Decreased Fetal Movement         Allergies:  Allergies[1]    Orders Placed This Encounter   Procedures    POCT Ferning       Lab Results   Component Value Date    WBC 12.2 (H) 10/10/2024    HGB 12.6 10/10/2024    HCT 36.0 10/10/2024    .0 10/10/2024       Clinitek UA  Lab Results   Component Value Date    URINECUL No Growth at 18-24 hrs. 2024       UA  No results found for: \"COLORUR\", \"CLARITY\", \"SPECGRAVITY\", \"PROUR\", \"GLUUR\", \"KETUR\", \"BILUR\", \"BLOODURINE\", \"NITRITE\", \"UROBILINOGEN\", \"LEUUR\", \"UASA\"    Vitals:    10/13/24 1545   BP: 122/69   Pulse: 84       NST  Variability: Moderate           Accelerations: Yes           Decelerations: None            Baseline: 130 BPM           Uterine Irritability: No           Contractions: Not present                                        Acoustic Stimulator: No           Nonstress Test Interpretation: Reactive           Nonstress Test Second Interpretation: Reactive          FHR Category: Category I             Additional Comments       Pt presented w/ c/o LOF early this morning and not feeling the baby move for the last few hours. Sterile spec exam performed, no pooling. SVE: 0/0/-4. +FM. BPP: 8/8. Orders received for discharge per Dr. Abdi. Pt instructions given w/ written and verbal labor precautions. Pt verbalized understanding.     Chief Complaint   Patient presents with    R/o Rom    Decreased Fetal Movement         Geoff FREEMAN RN  10/13/2024 4:46 PM         [1]   Allergies  Allergen Reactions    Penicillins RASH     rash

## 2024-10-15 ENCOUNTER — APPOINTMENT (OUTPATIENT)
Dept: PHYSICAL THERAPY | Age: 34
End: 2024-10-15
Attending: OBSTETRICS & GYNECOLOGY
Payer: COMMERCIAL

## 2024-10-15 ENCOUNTER — ROUTINE PRENATAL (OUTPATIENT)
Dept: OBGYN CLINIC | Facility: CLINIC | Age: 34
End: 2024-10-15
Payer: COMMERCIAL

## 2024-10-15 VITALS
DIASTOLIC BLOOD PRESSURE: 70 MMHG | SYSTOLIC BLOOD PRESSURE: 116 MMHG | HEIGHT: 67 IN | BODY MASS INDEX: 27.41 KG/M2 | WEIGHT: 174.63 LBS

## 2024-10-15 DIAGNOSIS — Z34.00 ENCNTR FOR SUPRVSN OF NORMAL FIRST PREGNANCY, UNSP TRIMESTER (HCC): Primary | ICD-10-CM

## 2024-10-15 NOTE — PROGRESS NOTES
at 39 W  DW her labor precautions. DW her option of elective IOL. She would like to wait until next week. DW her that this may take several days.

## 2024-10-23 ENCOUNTER — ROUTINE PRENATAL (OUTPATIENT)
Dept: OBGYN CLINIC | Facility: CLINIC | Age: 34
End: 2024-10-23
Payer: COMMERCIAL

## 2024-10-23 ENCOUNTER — TELEPHONE (OUTPATIENT)
Dept: OBGYN CLINIC | Facility: CLINIC | Age: 34
End: 2024-10-23

## 2024-10-23 VITALS
DIASTOLIC BLOOD PRESSURE: 78 MMHG | BODY MASS INDEX: 27.18 KG/M2 | WEIGHT: 173.19 LBS | SYSTOLIC BLOOD PRESSURE: 138 MMHG | HEIGHT: 67 IN

## 2024-10-23 DIAGNOSIS — O48.0 POST-TERM PREGNANCY, 40-42 WEEKS OF GESTATION (HCC): Primary | ICD-10-CM

## 2024-10-23 PROCEDURE — 59025 FETAL NON-STRESS TEST: CPT | Performed by: OBSTETRICS & GYNECOLOGY

## 2024-10-23 NOTE — PROGRESS NOTES
NST for postdates reactive  SVE closed/70/0 vertex presentation  Patient would like to wait until 41 W to be induced. + FM. DW her kick counts.   Plan IOL. 10/29/2024. DANUTA pt the procedure.

## 2024-10-23 NOTE — TELEPHONE ENCOUNTER
Scheduled 10/29 9pm      ----- Message from Roseann Tam sent at 10/23/2024  3:26 PM CDT -----  Please schedule this patient for a postdates IOL @ 41 W the evening of 10/29/2024 with cytotec. She is closed/70/0 and vertex.

## 2024-10-28 ENCOUNTER — ROUTINE PRENATAL (OUTPATIENT)
Dept: OBGYN CLINIC | Facility: CLINIC | Age: 34
End: 2024-10-28
Payer: COMMERCIAL

## 2024-10-28 ENCOUNTER — TELEPHONE (OUTPATIENT)
Dept: OBGYN UNIT | Facility: HOSPITAL | Age: 34
End: 2024-10-28

## 2024-10-28 VITALS
BODY MASS INDEX: 28.04 KG/M2 | HEIGHT: 67 IN | SYSTOLIC BLOOD PRESSURE: 126 MMHG | WEIGHT: 178.63 LBS | DIASTOLIC BLOOD PRESSURE: 72 MMHG

## 2024-10-28 DIAGNOSIS — Z34.00 ENCNTR FOR SUPRVSN OF NORMAL FIRST PREGNANCY, UNSP TRIMESTER (HCC): Primary | ICD-10-CM

## 2024-10-28 NOTE — PROGRESS NOTES
RETURN OB VISIT    Pastora Erickson is a 34 year old  at 40w6d presenting for return OB visit. Today she reports occasional contractions worse at night, no bleeding or leaking fluid. Baby is moving well.    SVE /-3/medium/posterior. Membrane sweep performed. Discussed IOL process. All questions answered. IOL scheduled for tomorrow evening.     Nabila Luna, DO

## 2024-10-29 ENCOUNTER — APPOINTMENT (OUTPATIENT)
Dept: OBGYN CLINIC | Facility: HOSPITAL | Age: 34
End: 2024-10-29
Attending: OBSTETRICS & GYNECOLOGY
Payer: COMMERCIAL

## 2024-10-29 ENCOUNTER — HOSPITAL ENCOUNTER (INPATIENT)
Facility: HOSPITAL | Age: 34
LOS: 3 days | Discharge: HOME | End: 2024-11-01
Attending: OBSTETRICS & GYNECOLOGY | Admitting: OBSTETRICS & GYNECOLOGY
Payer: COMMERCIAL

## 2024-10-29 PROBLEM — Z34.90 PREGNANT (HCC): Status: ACTIVE | Noted: 2024-10-29

## 2024-10-29 LAB
BASOPHILS # BLD AUTO: 0.05 X10(3) UL (ref 0–0.2)
BASOPHILS NFR BLD AUTO: 0.4 %
DEPRECATED RDW RBC AUTO: 42.9 FL (ref 35.1–46.3)
EOSINOPHIL # BLD AUTO: 0.09 X10(3) UL (ref 0–0.7)
EOSINOPHIL NFR BLD AUTO: 0.7 %
ERYTHROCYTE [DISTWIDTH] IN BLOOD BY AUTOMATED COUNT: 12.5 % (ref 11–15)
HCT VFR BLD AUTO: 33.3 %
HGB BLD-MCNC: 12.1 G/DL
IMM GRANULOCYTES # BLD AUTO: 0.08 X10(3) UL (ref 0–1)
IMM GRANULOCYTES NFR BLD: 0.7 %
LYMPHOCYTES # BLD AUTO: 2.42 X10(3) UL (ref 1–4)
LYMPHOCYTES NFR BLD AUTO: 20 %
MCH RBC QN AUTO: 33.8 PG (ref 26–34)
MCHC RBC AUTO-ENTMCNC: 36.3 G/DL (ref 31–37)
MCV RBC AUTO: 93 FL
MONOCYTES # BLD AUTO: 0.96 X10(3) UL (ref 0.1–1)
MONOCYTES NFR BLD AUTO: 8 %
NEUTROPHILS # BLD AUTO: 8.47 X10 (3) UL (ref 1.5–7.7)
NEUTROPHILS # BLD AUTO: 8.47 X10(3) UL (ref 1.5–7.7)
NEUTROPHILS NFR BLD AUTO: 70.2 %
PLATELET # BLD AUTO: 155 10(3)UL (ref 150–450)
RBC # BLD AUTO: 3.58 X10(6)UL
WBC # BLD AUTO: 12.1 X10(3) UL (ref 4–11)

## 2024-10-29 RX ORDER — LIDOCAINE HYDROCHLORIDE 10 MG/ML
30 INJECTION, SOLUTION EPIDURAL; INFILTRATION; INTRACAUDAL; PERINEURAL ONCE
Status: DISCONTINUED | OUTPATIENT
Start: 2024-10-29 | End: 2024-10-31 | Stop reason: HOSPADM

## 2024-10-29 RX ORDER — IBUPROFEN 600 MG/1
600 TABLET, FILM COATED ORAL ONCE AS NEEDED
Status: COMPLETED | OUTPATIENT
Start: 2024-10-29 | End: 2024-10-31

## 2024-10-29 RX ORDER — ACETAMINOPHEN 500 MG
1000 TABLET ORAL EVERY 6 HOURS PRN
Status: DISCONTINUED | OUTPATIENT
Start: 2024-10-29 | End: 2024-10-31 | Stop reason: HOSPADM

## 2024-10-29 RX ORDER — DEXTROSE, SODIUM CHLORIDE, SODIUM LACTATE, POTASSIUM CHLORIDE, AND CALCIUM CHLORIDE 5; .6; .31; .03; .02 G/100ML; G/100ML; G/100ML; G/100ML; G/100ML
INJECTION, SOLUTION INTRAVENOUS AS NEEDED
Status: DISCONTINUED | OUTPATIENT
Start: 2024-10-29 | End: 2024-10-31 | Stop reason: HOSPADM

## 2024-10-29 RX ORDER — CITRIC ACID/SODIUM CITRATE 334-500MG
30 SOLUTION, ORAL ORAL AS NEEDED
Status: DISCONTINUED | OUTPATIENT
Start: 2024-10-29 | End: 2024-10-31 | Stop reason: HOSPADM

## 2024-10-29 RX ORDER — ACETAMINOPHEN 500 MG
500 TABLET ORAL EVERY 6 HOURS PRN
Status: DISCONTINUED | OUTPATIENT
Start: 2024-10-29 | End: 2024-10-31

## 2024-10-29 RX ORDER — TERBUTALINE SULFATE 1 MG/ML
0.25 INJECTION, SOLUTION SUBCUTANEOUS AS NEEDED
Status: DISCONTINUED | OUTPATIENT
Start: 2024-10-29 | End: 2024-10-31 | Stop reason: HOSPADM

## 2024-10-29 RX ORDER — SODIUM CHLORIDE, SODIUM LACTATE, POTASSIUM CHLORIDE, CALCIUM CHLORIDE 600; 310; 30; 20 MG/100ML; MG/100ML; MG/100ML; MG/100ML
INJECTION, SOLUTION INTRAVENOUS CONTINUOUS PRN
Status: DISCONTINUED | OUTPATIENT
Start: 2024-10-29 | End: 2024-10-31 | Stop reason: HOSPADM

## 2024-10-29 RX ORDER — ONDANSETRON 2 MG/ML
4 INJECTION INTRAMUSCULAR; INTRAVENOUS EVERY 6 HOURS PRN
Status: DISCONTINUED | OUTPATIENT
Start: 2024-10-29 | End: 2024-10-31 | Stop reason: HOSPADM

## 2024-10-30 ENCOUNTER — ANESTHESIA (OUTPATIENT)
Dept: OBGYN UNIT | Facility: HOSPITAL | Age: 34
End: 2024-10-30
Payer: COMMERCIAL

## 2024-10-30 ENCOUNTER — ANESTHESIA EVENT (OUTPATIENT)
Dept: OBGYN UNIT | Facility: HOSPITAL | Age: 34
End: 2024-10-30
Payer: COMMERCIAL

## 2024-10-30 LAB
ALBUMIN SERPL-MCNC: 3.6 G/DL (ref 3.2–4.8)
ALBUMIN/GLOB SERPL: 1.5 {RATIO} (ref 1–2)
ALP LIVER SERPL-CCNC: 170 U/L
ALT SERPL-CCNC: 13 U/L
ANION GAP SERPL CALC-SCNC: 9 MMOL/L (ref 0–18)
ANTIBODY SCREEN: NEGATIVE
AST SERPL-CCNC: 26 U/L (ref ?–34)
BILIRUB SERPL-MCNC: 0.3 MG/DL (ref 0.3–1.2)
BUN BLD-MCNC: 8 MG/DL (ref 9–23)
BUN/CREAT SERPL: 11.8 (ref 10–20)
CALCIUM BLD-MCNC: 9.1 MG/DL (ref 8.7–10.4)
CHLORIDE SERPL-SCNC: 107 MMOL/L (ref 98–112)
CO2 SERPL-SCNC: 21 MMOL/L (ref 21–32)
CREAT BLD-MCNC: 0.68 MG/DL
EGFRCR SERPLBLD CKD-EPI 2021: 119 ML/MIN/1.73M2 (ref 60–?)
GLOBULIN PLAS-MCNC: 2.4 G/DL (ref 2–3.5)
GLUCOSE BLD-MCNC: 94 MG/DL (ref 70–99)
OSMOLALITY SERPL CALC.SUM OF ELEC: 282 MOSM/KG (ref 275–295)
POTASSIUM SERPL-SCNC: 4.5 MMOL/L (ref 3.5–5.1)
PROT SERPL-MCNC: 6 G/DL (ref 5.7–8.2)
RH BLOOD TYPE: POSITIVE
SODIUM SERPL-SCNC: 137 MMOL/L (ref 136–145)
T PALLIDUM AB SER QL IA: NONREACTIVE

## 2024-10-30 PROCEDURE — 10H07YZ INSERTION OF OTHER DEVICE INTO PRODUCTS OF CONCEPTION, VIA NATURAL OR ARTIFICIAL OPENING: ICD-10-PCS | Performed by: OBSTETRICS & GYNECOLOGY

## 2024-10-30 PROCEDURE — 3E0P7VZ INTRODUCTION OF HORMONE INTO FEMALE REPRODUCTIVE, VIA NATURAL OR ARTIFICIAL OPENING: ICD-10-PCS | Performed by: OBSTETRICS & GYNECOLOGY

## 2024-10-30 PROCEDURE — 10907ZC DRAINAGE OF AMNIOTIC FLUID, THERAPEUTIC FROM PRODUCTS OF CONCEPTION, VIA NATURAL OR ARTIFICIAL OPENING: ICD-10-PCS | Performed by: OBSTETRICS & GYNECOLOGY

## 2024-10-30 RX ORDER — FAMOTIDINE 20 MG/1
20 TABLET, FILM COATED ORAL ONCE
Status: COMPLETED | OUTPATIENT
Start: 2024-10-30 | End: 2024-10-30

## 2024-10-30 RX ORDER — METOCLOPRAMIDE 10 MG/1
10 TABLET ORAL ONCE
Status: DISCONTINUED | OUTPATIENT
Start: 2024-10-30 | End: 2024-10-30

## 2024-10-30 RX ORDER — BUPIVACAINE HYDROCHLORIDE 2.5 MG/ML
20 INJECTION, SOLUTION EPIDURAL; INFILTRATION; INTRACAUDAL ONCE
Status: DISCONTINUED | OUTPATIENT
Start: 2024-10-30 | End: 2024-10-30

## 2024-10-30 RX ORDER — ACETAMINOPHEN 500 MG
1000 TABLET ORAL ONCE
Status: DISCONTINUED | OUTPATIENT
Start: 2024-10-30 | End: 2024-10-31 | Stop reason: HOSPADM

## 2024-10-30 RX ORDER — METOCLOPRAMIDE 10 MG/1
10 TABLET ORAL ONCE
Status: COMPLETED | OUTPATIENT
Start: 2024-10-30 | End: 2024-10-30

## 2024-10-30 RX ORDER — METOCLOPRAMIDE HYDROCHLORIDE 5 MG/ML
10 INJECTION INTRAMUSCULAR; INTRAVENOUS ONCE
Status: DISCONTINUED | OUTPATIENT
Start: 2024-10-30 | End: 2024-10-30

## 2024-10-30 RX ORDER — NALBUPHINE HYDROCHLORIDE 10 MG/ML
2.5 INJECTION INTRAMUSCULAR; INTRAVENOUS; SUBCUTANEOUS
Status: DISCONTINUED | OUTPATIENT
Start: 2024-10-30 | End: 2024-10-30

## 2024-10-30 RX ORDER — FAMOTIDINE 10 MG/ML
20 INJECTION, SOLUTION INTRAVENOUS ONCE
Status: DISCONTINUED | OUTPATIENT
Start: 2024-10-30 | End: 2024-10-30

## 2024-10-30 RX ORDER — LIDOCAINE HYDROCHLORIDE 10 MG/ML
INJECTION, SOLUTION EPIDURAL; INFILTRATION; INTRACAUDAL; PERINEURAL AS NEEDED
Status: DISCONTINUED | OUTPATIENT
Start: 2024-10-30 | End: 2024-10-31 | Stop reason: SURG

## 2024-10-30 RX ORDER — BUPIVACAINE HCL/0.9 % NACL/PF 0.25 %
5 PLASTIC BAG, INJECTION (ML) EPIDURAL AS NEEDED
Status: DISCONTINUED | OUTPATIENT
Start: 2024-10-30 | End: 2024-10-31

## 2024-10-30 RX ORDER — FAMOTIDINE 10 MG/ML
20 INJECTION, SOLUTION INTRAVENOUS ONCE
Status: COMPLETED | OUTPATIENT
Start: 2024-10-30 | End: 2024-10-30

## 2024-10-30 RX ORDER — BUPIVACAINE HCL/0.9 % NACL/PF 0.25 %
5 PLASTIC BAG, INJECTION (ML) EPIDURAL AS NEEDED
Status: DISCONTINUED | OUTPATIENT
Start: 2024-10-30 | End: 2024-10-30

## 2024-10-30 RX ORDER — NALBUPHINE HYDROCHLORIDE 10 MG/ML
2.5 INJECTION INTRAMUSCULAR; INTRAVENOUS; SUBCUTANEOUS
Status: DISCONTINUED | OUTPATIENT
Start: 2024-10-30 | End: 2024-10-31

## 2024-10-30 RX ORDER — METOCLOPRAMIDE HYDROCHLORIDE 5 MG/ML
10 INJECTION INTRAMUSCULAR; INTRAVENOUS ONCE
Status: COMPLETED | OUTPATIENT
Start: 2024-10-30 | End: 2024-10-30

## 2024-10-30 RX ORDER — FAMOTIDINE 20 MG/1
20 TABLET, FILM COATED ORAL ONCE
Status: DISCONTINUED | OUTPATIENT
Start: 2024-10-30 | End: 2024-10-30

## 2024-10-30 RX ORDER — LIDOCAINE HYDROCHLORIDE AND EPINEPHRINE 15; 5 MG/ML; UG/ML
INJECTION, SOLUTION EPIDURAL AS NEEDED
Status: DISCONTINUED | OUTPATIENT
Start: 2024-10-30 | End: 2024-10-31 | Stop reason: SURG

## 2024-10-30 RX ORDER — BUPIVACAINE HYDROCHLORIDE 2.5 MG/ML
20 INJECTION, SOLUTION EPIDURAL; INFILTRATION; INTRACAUDAL ONCE
Status: DISCONTINUED | OUTPATIENT
Start: 2024-10-30 | End: 2024-10-31 | Stop reason: HOSPADM

## 2024-10-30 RX ADMIN — LIDOCAINE HYDROCHLORIDE 5 ML: 10 INJECTION, SOLUTION EPIDURAL; INFILTRATION; INTRACAUDAL; PERINEURAL at 17:15:00

## 2024-10-30 RX ADMIN — LIDOCAINE HYDROCHLORIDE AND EPINEPHRINE 5 ML: 15; 5 INJECTION, SOLUTION EPIDURAL at 17:28:00

## 2024-10-30 NOTE — ANESTHESIA PROCEDURE NOTES
Labor Analgesia    Date/Time: 10/30/2024 5:10 PM    Performed by: Lida Romero DO  Authorized by: Lida Romero DO      General Information and Staff    Start Time:  10/30/2024 5:10 PM  End Time:  10/30/2024 5:28 PM  Anesthesiologist:  Lida Romero DO  Performed by:  Anesthesiologist  Patient Location:  OB  Site Identification: surface landmarks    Reason for Block: labor epidural    Preanesthetic Checklist: patient identified, IV checked, site marked, risks and benefits discussed, monitors and equipment checked, pre-op evaluation, timeout performed, IV bolus, anesthesia consent and sterile technique used      Procedure Details    Patient Position:  Sitting  Prep: ChloraPrep and patient draped    Monitoring:  Heart rate and continuous pulse ox  Approach:  Midline    Epidural Needle    Injection Technique:  AV air  Needle Type:  Tuohy  Needle Gauge:  18 G  Needle Length:  3.5 in  Needle Insertion Depth:  7  Location:  L2-3    Spinal Needle    Needle Type:  Pencil-tip  Needle Gauge:  27 G  Needle Length:  4.75 in    Catheter    Catheter Type:  Multi-orifice  Catheter Size:  20 G  Catheter at Skin Depth:  11  Test Dose:  Negative    Assessment      Additional Comments     Combined Spinal Epidural

## 2024-10-30 NOTE — PROGRESS NOTES
LABOR PROGRESS NOTE    Subjective: Patient resting in bed, comfortable s/p epidural.     Vitals:    10/30/24 1740   BP: 135/70   Pulse: 76   Resp:    Temp:        PHYSICAL EXAM  Gen: NAD, resting in bed  Abd: s/-oft, gravid  SVE: 5/80/-2, IUPC placed     FHT:  140/mod/+accels/-decels    A/P: Pastora is a 34 year old  at 41w1d admitted for IOL for postdates pregnancy.       #Labor  -admission SVE 1cm dilation  -s/p 1 dose misoprostol  -regulo spontaneously, however, contractions spacing out to q4 min  -AROM minimal blood tinged fluid at 1345  -minimal change after ~4hrs on pitocin, IUPC placed   -repeat SVE in 6hrs  -patient has concerns regarding CS. Discussed CS is a possibility in any labor. Discussed criteria for dx of failed IOL/arrest of dilation. Maternal and fetal status are reassuring at this point, will continue IOL  -Pain: epidural working well  -Gbs negative     #gHTN  -multiple mild range BP  -isolated severe range BP immediately prior to epidural placement likely pain/movement related  -asymptomatic  -CBC, CMP wnl, no evidence of HELLP  -will continue to monitor BP closely    Nabila Luna DO

## 2024-10-30 NOTE — PROGRESS NOTES
LABOR PROGRESS NOTE    Subjective: Patient resting in bed, reports cramping improved since Cook balloon expelled.     Vitals:    10/30/24 1150   BP: 129/76   Pulse: 71   Resp: 17   Temp: 98.1 °F (36.7 °C)       PHYSICAL EXAM  Gen: NAD, resting in bed  Abd: s/-oft, gravid  SVE: 4-5/80/-2    FHT:  135/mod/+accels/-decels    A/P: Pastora is a 34 year old  at 41w1d admitted for IOL for postdates pregnancy.       #Labor  -admission SVE 1cm dilation  -s/p 1 dose misoprostol  -regulo spontaneously, however, contractions spacing out to q4 min  -AROM minimal blood tinged fluid at 1345  -will plan to start pitocin at low dose protocol  -repeat SVE in 6hrs  -Pain: per patient preference, plans for epidural  -Gbs negative       Nabila Luna, DO

## 2024-10-30 NOTE — H&P
OBSTETRICS H&P    Chief Complaint: postdates IOL    HPI: Pastora presented to the hospital yesterday evening for scheduled IOL for postdates pregnancy. She feels well at this time, complains of some cramping. No vaginal bleeding or leaking fluid. Baby is moving well. She received a single dose of misoprostol overnight for cervical ripening, she has been regulo spontaneously since then. Labor course so far complicated by 2 prolonged decelerations, all associated with movement and with spontaneous recovery.     Obstetric History  OB History    Para Term  AB Living   1 0 0 0 0 0   SAB IAB Ectopic Multiple Live Births   0 0 0 0        # Outcome Date GA Lbr Mike/2nd Weight Sex Type Anes PTL Lv   1 Current                Prenatal Issues: none    Vitals  Vitals:    10/29/24 2315 10/30/24 0100 10/30/24 0308 10/30/24 0315   BP: 135/80   (!) 147/96   Pulse: 82   74   Resp: 16      Temp: 98.2 °F (36.8 °C)  97.7 °F (36.5 °C) 97.7 °F (36.5 °C)   TempSrc: Oral  Oral Oral   SpO2:    99%   Weight:  178 lb (80.7 kg)           PHYSICAL EXAM  General: NAD, resting in bed  Chest: symmetric respirations, no increased work of breathing   Abdomen: soft, gravid   SVE 1-2/70/-3/medium/posterior  FHT: tracing since admission reviewed. The following prolonged decelerations were noted:  0304 - 0308 down to 60s, spontaneous recovery  0740 - 0745 down to 90s  0856 - 0900 questionable deceleration/period of discontinuity during placement of Cook balloon, recovered with left lateral position   Currently FHT is reassuring, baseline 135/moderate variability/+accels/-decels  Prue: q2 min    A/P: Pastora Erickson is a 34 year old year old  at 41w1d presenting for scheduled IOL.    #IOL  -admission SVE unchanged from the office on admission  -s/p 1 dose misoprostol, regulo q1-2 minutes spontaneously   -Cook balloon placed at 0915, inflated to 80cc x2  -will continue to monitor toco, if contractions space will start pitocin  protocol 1  -pain: per patient preference  -GBS negative        Nabila Luna DO

## 2024-10-30 NOTE — PROGRESS NOTES
Pt is a 34 year old female admitted to LDR5/LDR5-A.     Chief Complaint   Patient presents with    Scheduled Induction     Postdates       Pt is  41w0d intra-uterine pregnancy.  History obtained, consents signed. Oriented to room, staff, and plan of care.

## 2024-10-30 NOTE — ANESTHESIA PREPROCEDURE EVALUATION
Anesthesia PreOp Note    HPI:     Pastora Erickson is a 34 year old female who presents for preoperative consultation requested by: * No surgeons listed *    Date of Surgery: 10/30/2024    * No procedures listed *  Indication: * No pre-op diagnosis entered *    Relevant Problems   No relevant active problems       NPO:                         History Review:  Patient Active Problem List    Diagnosis Date Noted    Pregnant ([facility]) 10/29/2024    Post-term pregnancy, 40-42 weeks of gestation ([facility]) 10/23/2024    Abnormal ultrasonic finding on  screening of mother, antepartum - fetal pyelectasis 2024    Encntr for suprvsn of normal first pregnancy, unsp trimester ([facility]) 2024    Genetic carrier  - +Hereditary Fructose Intolerance- partner testing negative 2024       Past Medical History:    ADHD       Past Surgical History:   Procedure Laterality Date    Other surgical history      egg retrival as an egg donor       Prescriptions Prior to Admission[1]  Current Medications and Prescriptions Ordered in Epic[2]    Allergies[3]    Family History   Problem Relation Age of Onset    No Known Problems Father     No Known Problems Mother     Cancer Maternal Grandmother         unsure of type    Stroke Maternal Grandfather     No Known Problems Paternal Grandmother     No Known Problems Paternal Grandfather     No Known Problems Sister     ADHD Sister     ADHD Brother     Cancer Maternal Cousin Female         leukemia     Social History     Socioeconomic History    Marital status: Single   Occupational History    Occupation: getting phd in learning sciences   Tobacco Use    Smoking status: Never    Smokeless tobacco: Never   Substance and Sexual Activity    Alcohol use: Not Currently     Comment: cocktail/wine everyday    Drug use: Not Currently     Types: Cannabis    Sexual activity: Yes     Partners: Male     Birth control/protection: Condom   Other Topics Concern    Blood Transfusions No        Available pre-op labs reviewed.  Lab Results   Component Value Date    WBC 12.1 (H) 10/29/2024    RBC 3.58 (L) 10/29/2024    HGB 12.1 10/29/2024    HCT 33.3 (L) 10/29/2024    MCV 93.0 10/29/2024    MCH 33.8 10/29/2024    MCHC 36.3 10/29/2024    RDW 12.5 10/29/2024    .0 10/29/2024     Lab Results   Component Value Date     10/30/2024    K 4.5 10/30/2024     10/30/2024    CO2 21.0 10/30/2024    BUN 8 (L) 10/30/2024    CREATSERUM 0.68 10/30/2024    GLU 94 10/30/2024    CA 9.1 10/30/2024          Vital Signs:  Body mass index is 27.88 kg/m².   weight is 80.7 kg (178 lb). Her oral temperature is 98.1 °F (36.7 °C). Her blood pressure is 141/86 and her pulse is 80. Her respiration is 17 and oxygen saturation is 99%.   Vitals:    10/30/24 0720 10/30/24 1150 10/30/24 1418 10/30/24 1458   BP: 141/80 129/76 (!) 134/98 141/86   Pulse: 75 71 81 80   Resp: 17 17     Temp: 99.3 °F (37.4 °C) 98.1 °F (36.7 °C) 98.1 °F (36.7 °C)    TempSrc: Oral Oral Oral    SpO2:       Weight:            Anesthesia Evaluation     Patient summary reviewed    No history of anesthetic complications   Airway   TM distance: >3 FB  Neck ROM: full  Dental      Pulmonary - negative ROS and normal exam   (-) asthma, shortness of breath, recent URI  Cardiovascular - negative ROS  Exercise tolerance: good  (-) hypertension, dysrhythmias    Rhythm: regular  Rate: normal    Neuro/Psych - negative ROS   (-) seizures, neuromuscular disease    GI/Hepatic/Renal - negative ROS   (-) hepatitis, liver disease, renal disease    Endo/Other - negative ROS   (-) diabetes mellitus, blood dyscrasia  Abdominal  - normal exam                 Anesthesia Plan:   ASA:  2  Plan:   Epidural  Post-op Pain Management: Intrathecal narcotics and Continuous epidural  Informed Consent Plan and Risks Discussed With:  Patient      I have informed Pastora Erickson and/or legal guardian or family member of the nature of the anesthetic plan, benefits, risks  including possible dental damage if relevant, major complications, and any alternative forms of anesthetic management.   All of the patient's questions were answered to the best of my ability. The patient desires the anesthetic management as planned.  Lida Sloane Heather,   10/30/2024 5:35 PM  Present on Admission:  **None**           [1]   Medications Prior to Admission   Medication Sig Dispense Refill Last Dose/Taking    Prenatal Vit-Fe Fumarate-FA (PRENATAL 19 OR) Take by mouth.   10/29/2024    diphenhydrAMINE-APAP, sleep, (TYLENOL PM EXTRA STRENGTH)  MG Oral Tab Take by mouth.       VYVANSE 20 MG Oral Cap        Multiple Vitamins-Minerals (MULTIVITAMIN ADULT OR) Take by mouth.      [2]   Current Facility-Administered Medications Ordered in Epic   Medication Dose Route Frequency Provider Last Rate Last Admin    oxyTOCIN in sodium chloride 0.9% (Pitocin) 30 Units/500mL infusion premix  0.5-20 mackenzie-units/min Intravenous Continuous Oxana Lindsay, DO 3 mL/hr at 10/30/24 1530 3 mackenzie-units/min at 10/30/24 1530    fentaNYL-bupivacaine 2 mcg/mL-0.125% in sodium chloride 0.9% 100 mL EPIDURAL infusion premix   Epidural Continuous Jose Bueno MD        fentaNYL (Sublimaze) 50 mcg/mL injection 100 mcg  100 mcg Epidural Once Jose Bueno MD        bupivacaine PF (Marcaine) 0.25% injection  20 mL Epidural Once Jose Bueno MD        EPHEDrine (PF) 25 MG/5 ML injection 5 mg  5 mg Intravenous PRN Jose Bueno MD        nalbuphine (Nubain) 10 mg/mL injection 2.5 mg  2.5 mg Intravenous Q15 Min PRN Jose Bueno MD        acetaminophen (Tylenol Extra Strength) tab 500 mg  500 mg Oral Q6H PRN Dubyel, Oxana T, DO        acetaminophen (Tylenol Extra Strength) tab 1,000 mg  1,000 mg Oral Q6H PRN Dubyel, Oxana T, DO        ibuprofen (Motrin) tab 600 mg  600 mg Oral Once PRN Dubyel, Oxana T, DO        ondansetron (Zofran) 4 MG/2ML injection 4 mg  4 mg Intravenous Q6H PRN Dubyel, Oxana T, DO        oxyTOCIN in  sodium chloride 0.9% (Pitocin) 30 Units/500mL infusion premix  62.5-900 mackenzie-units/min Intravenous Continuous Dubyel, Oxana T, DO        terbutaline (Brethine) 1 MG/ML injection 0.25 mg  0.25 mg Subcutaneous PRN Dubyel, Oxana T, DO        sodium citrate-citric acid (Bicitra) 500-334 MG/5ML oral solution 30 mL  30 mL Oral PRN Dubyel, Oxana T, DO        lidocaine PF (Xylocaine-MPF) 1% injection  30 mL Intradermal Once Dubyel, Oxana T, DO        lactated ringers infusion   Intravenous Continuous PRN Dubyel, Oxana T,  mL/hr at 10/30/24 1700 Restarted at 10/30/24 1700    dextrose in lactated ringers 5% infusion   Intravenous PRN Dubyel, Oxana T, DO        lactated ringers IV bolus 500 mL  500 mL Intravenous PRN Dubyel, Oxana T,  mL/hr at 10/30/24 0317 500 mL at 10/30/24 0317     No current Epic-ordered outpatient medications on file.   [3]   Allergies  Allergen Reactions    Penicillins RASH     rash

## 2024-10-31 LAB
DEPRECATED RDW RBC AUTO: 44.4 FL (ref 35.1–46.3)
ERYTHROCYTE [DISTWIDTH] IN BLOOD BY AUTOMATED COUNT: 12.8 % (ref 11–15)
HCT VFR BLD AUTO: 25.3 %
HGB BLD-MCNC: 8.9 G/DL
MCH RBC QN AUTO: 33.7 PG (ref 26–34)
MCHC RBC AUTO-ENTMCNC: 35.2 G/DL (ref 31–37)
MCV RBC AUTO: 95.8 FL
PLATELET # BLD AUTO: 133 10(3)UL (ref 150–450)
RBC # BLD AUTO: 2.64 X10(6)UL
WBC # BLD AUTO: 21.8 X10(3) UL (ref 4–11)

## 2024-10-31 PROCEDURE — 0W3R7ZZ CONTROL BLEEDING IN GENITOURINARY TRACT, VIA NATURAL OR ARTIFICIAL OPENING: ICD-10-PCS | Performed by: OBSTETRICS & GYNECOLOGY

## 2024-10-31 PROCEDURE — 0UQJXZZ REPAIR CLITORIS, EXTERNAL APPROACH: ICD-10-PCS | Performed by: OBSTETRICS & GYNECOLOGY

## 2024-10-31 PROCEDURE — 59410 OBSTETRICAL CARE: CPT | Performed by: OBSTETRICS & GYNECOLOGY

## 2024-10-31 PROCEDURE — 0KQM0ZZ REPAIR PERINEUM MUSCLE, OPEN APPROACH: ICD-10-PCS | Performed by: OBSTETRICS & GYNECOLOGY

## 2024-10-31 RX ORDER — ACETAMINOPHEN 500 MG
1000 TABLET ORAL ONCE
Status: DISCONTINUED | OUTPATIENT
Start: 2024-10-31 | End: 2024-10-31

## 2024-10-31 RX ORDER — CLINDAMYCIN PHOSPHATE 900 MG/50ML
900 INJECTION, SOLUTION INTRAVENOUS EVERY 8 HOURS
Status: COMPLETED | OUTPATIENT
Start: 2024-10-31 | End: 2024-10-31

## 2024-10-31 RX ORDER — CARBOPROST TROMETHAMINE 250 UG/ML
INJECTION, SOLUTION INTRAMUSCULAR
Status: COMPLETED
Start: 2024-10-31 | End: 2024-10-31

## 2024-10-31 RX ORDER — ACETAMINOPHEN 500 MG
500 TABLET ORAL EVERY 6 HOURS PRN
Status: DISCONTINUED | OUTPATIENT
Start: 2024-10-31 | End: 2024-11-01

## 2024-10-31 RX ORDER — BISACODYL 10 MG
10 SUPPOSITORY, RECTAL RECTAL ONCE AS NEEDED
Status: DISCONTINUED | OUTPATIENT
Start: 2024-10-31 | End: 2024-11-01

## 2024-10-31 RX ORDER — DOCUSATE SODIUM 100 MG/1
100 CAPSULE, LIQUID FILLED ORAL
Status: DISCONTINUED | OUTPATIENT
Start: 2024-10-31 | End: 2024-11-01

## 2024-10-31 RX ORDER — IBUPROFEN 600 MG/1
600 TABLET, FILM COATED ORAL EVERY 6 HOURS
Status: DISCONTINUED | OUTPATIENT
Start: 2024-10-31 | End: 2024-11-01

## 2024-10-31 RX ORDER — ACETAMINOPHEN 500 MG
1000 TABLET ORAL EVERY 6 HOURS PRN
Status: DISCONTINUED | OUTPATIENT
Start: 2024-10-31 | End: 2024-11-01

## 2024-10-31 RX ORDER — AMMONIA INHALANTS 0.04 G/.3ML
0.3 INHALANT RESPIRATORY (INHALATION) AS NEEDED
Status: DISCONTINUED | OUTPATIENT
Start: 2024-10-31 | End: 2024-11-01

## 2024-10-31 RX ORDER — SIMETHICONE 80 MG
80 TABLET,CHEWABLE ORAL 3 TIMES DAILY PRN
Status: DISCONTINUED | OUTPATIENT
Start: 2024-10-31 | End: 2024-11-01

## 2024-10-31 RX ORDER — LOPERAMIDE HYDROCHLORIDE 2 MG/1
4 CAPSULE ORAL 4 TIMES DAILY PRN
Status: DISCONTINUED | OUTPATIENT
Start: 2024-10-31 | End: 2024-10-31

## 2024-10-31 NOTE — LACTATION NOTE
10/31/24 1700   Evaluation Type   Evaluation Type Inpatient   Problems identified   Problems identified Knowledge deficit   Problems Identified Other Blood loss 1685ml   Maternal history   Maternal history Induction of labor   Other/comment post dates   Breastfeeding goal   Breastfeeding goal To maintain breast milk feeding per patient goal   Maternal Assessment   Bilateral Breasts Soft;Wide spaced   Bilateral Nipples Colostrum easily expressed;Elastic;Everted   Prior breastfeeding experience (comment below) Primip   Breastfeeding Assistance Breastfeeding assistance provided with permission;Breast exam provided with permission;Hand expression provided with permission   Pain assessment   Location/Comment denies   Treatment of Sore Nipples Deeper latch techniques   Guidelines for use of:   Breast pump type Spectra   Current use of pump: not indicated at this time   Other (comment) Discussed the handbook, skin to skin, hand massage and expression, mom able to hand express drops, assisted with a latch in football hold, infant able to attain a deep latch, shown breast compression and gentle waking movements, informed of the [place] lactation clinic, encouraged to call if needed.

## 2024-10-31 NOTE — PROGRESS NOTES
Patient complete +1 station at around 0. Pushing initiated at 2130. Minimal descent of fetal head after 1hr pushing. Attempted slowing and pausing epidural to improve sensation of contractions. Pitocin increased. Coaching provided. Currently exam unchanged, now with 1cm caput. Fetus appears to be in BLAKE position. Discussed concern for cephalopelvic disproportion. At this point patient does not meet criteria for arrest of descent and fetal and maternal status remains reassuring. Discussed option to keep pushing vs proceed with . Patient asked for some time to discuss with her .     Nabila Luna, DO

## 2024-10-31 NOTE — PROGRESS NOTES
After discussion with her  patient elects for . Reviewed this would be elective as she has not met criteria for arrest disorder at this point. Patient feels continuing to push would be futile as she is having trouble feeling contractions and when she does feel contractions there is still no descent.     We discussed risks of  section including bleeding, infection, and injury to surrounding organs including uterus, fallopian tubes, bowel, bladder, ureters, and infant. We discussed risk of hemorrhage and medications that may be used to manage atony. We discussed possibility of needing blood transfusion and associated risks of viral infection and transfusion reaction. We discussed risk of surgical infection and infection-prevention measures. We discussed risk of  hysterectomy if conservative measures fail to control bleeding. All questions answered, consent signed.     Nabila Luna, DO

## 2024-10-31 NOTE — PROGRESS NOTES
At 2315 patient stated she wanted to try pushing again. She pushed for approximately 20 min and made descent to +2 station so decision was made to continue pushing. FHT remains overall reassuring.     Nabila Luna, DO

## 2024-10-31 NOTE — ANESTHESIA POSTPROCEDURE EVALUATION
Patient: Pastora Erickson    Procedure Summary       Date: 10/30/24 Room / Location:     Anesthesia Start: 1710 Anesthesia Stop: 10/31/24 0056    Procedure: LABOR ANALGESIA Diagnosis:     Scheduled Providers:  Anesthesiologist: Danna Bueno MD    Anesthesia Type: epidural ASA Status: 2            Anesthesia Type: epidural    Vitals Value Taken Time   /72 10/31/24 0146   Temp 101.1 °F (38.4 °C) 10/31/24 0045   Pulse 116 10/31/24 0151   Resp 22 10/31/24 0338   SpO2 97 % 10/31/24 0151   Vitals shown include unfiled device data.    [facility] AN Post Evaluation:   Patient Evaluated in PACU  Patient Participation: complete - patient participated  Level of Consciousness: awake and alert  Pain Management: adequate  Airway Patency:patent  Dental exam unchanged from preop  Yes    Nausea/Vomiting: none  Cardiovascular Status: acceptable  Respiratory Status: acceptable  Postoperative Hydration acceptable      DANNA BUENO MD  10/31/2024 3:38 AM

## 2024-10-31 NOTE — PROGRESS NOTES
[facility] Pharmacy Dosing Service     Initial Pharmacokinetic Consult for Aminoglycoside Dosing     Pastora Erickson is a 34 year old patient who is being initiated on aminoglycoside therapy for  fever . Pharmacy has been asked to dose gentamicin by Dr Luna. The dosing approach will be  fever , and the initial strategy will be extended interval dosing.    Vitals:    Actual Body Weight:   Wt Readings from Last 1 Encounters:   10/30/24 80.7 kg (178 lb)     Ideal body weight: 61.6 kg (135 lb 12.9 oz)  Adjusted ideal body weight: 69.3 kg (152 lb 10.9 oz)    Temp Readings from Last 1 Encounters:   10/30/24 97.6 °F (36.4 °C) (Oral)        Labs:   Recent Labs   Lab 10/30/24  1527   CREATSERUM 0.68      Estimated Creatinine Clearance: 115.5 mL/min (based on SCr of 0.68 mg/dL).   Recent Labs   Lab 10/29/24  2344   WBC 12.1*        Renal Dosing Considerations:    None     Assessment/Plan:   Empiric dosing weight: adjusted body weight (AdjBW)    Initial dose: 340 mg (5 mg/kg, capped at 520 mg) IV x 1 dose    Monitorin) Plan for aminoglycoside x 1 dose only    2) Goal levels are as follows:  none since 1 dose only    3) Pharmacy will order SCr as clinically indicated to assess renal function.    4) Pharmacy will monitor for toxicity and efficacy, adjust aminoglycoside dose and/or frequency, and order levels as appropriate per the Pharmacy and Therapeutics Committee approved protocol until discontinuation of the medication.       We appreciate the opportunity to assist in the care of this patient     Denton Anglin, PharmD  10/31/2024  12:53 AM  [place]  Pharmacy Extension: [phone]

## 2024-10-31 NOTE — LACTATION NOTE
This note was copied from a baby's chart.     10/31/24 1701   Evaluation Type   Evaluation Type Inpatient   Problems & Assessment   Infant Assessment Hunger cues present   Muscle tone Appropriate for GA   Feeding Assessment   Summary Current Feeding Breastfeeding exclusively   Breastfeeding Assessment Assisted with breastfeeding w/mother's permission;Deep latch achieved and observed;Calm and ready to breastfeed;Tolerated feeding well;Coordinated suck/swallow   Breastfeeding lasted # of minutes 10   Breastfeeding Positions football;left breast   Latch 1   Audible Sucks/Swallows 2   Type of Nipple 2   Comfort (Breast/Nipple) 2   Hold (Positioning) 1   LATCH Score 8   Other (comment) Discussed the handbook, skin to skin, hand massage and expression, mom able to hand express drops, assisted with a latch in football hold, infant able to attain a deep latch, shown breast compression and gentle waking movements, informed of the [facility], encouraged to call if needed.

## 2024-10-31 NOTE — L&D DELIVERY NOTE
Georgina, Girl [[id number]]      Labor Events     labor?: No   steroids?: None  Antibiotics received during labor?: No  Rupture date/time: 10/30/2024 1339     Rupture type: AROM  Fluid color: Clear  Labor type: Induced Onset of Labor  Induction: Misoprostol, Oxytocin, AROM  Indications for induction: Post-term Gestation  Intrapartum & labor complications: Variable decelerations, Temp 100.4 or greater       Labor Length    1st stage: 20h 08m  2nd stage: 3h 14m  3rd stage: 0h 18m       Labor Event Times    Labor onset date/time: 10/30/2024 0115  Dilation complete date/time: 10/30/2024 2123  Start pushing date/time: 10/30/2024 2138        Presentation    Presentation: Vertex  Position: Left Occiput Anterior       Operative Delivery    Operative Vaginal Delivery: No                Shoulder Dystocia    Shoulder Dystocia: No       Anesthesia    Method: Epidural               Delivery      Head delivery date/time: 10/31/2024 00:37:13   Delivery date/time:  10/31/24 00:37:25   Delivery type: Normal spontaneous vaginal delivery    Details:     Delivery location: delivery room  Delivery Room Temperature: 73       Delivery Providers    Delivering Clinician: Nabila Luna DO   Delivery personnel:  Provider Role   Carina Carroll, AIDEE Baby Nurse   Idania Taylor, RN Delivery Nurse   Judy Pickens RN Charge Nurse             Cord    Vessels: 3 Vessels  Complications: None  # of loops: 0  Timed cord clamping: Yes  Time in sec: 60  Cord blood disposition: to lab  Gases sent?: No       Resuscitation    Method: None       [place] Measurements      Weight: 3320 g 7 lb 5.1 oz Length: 54 cm     Head circum.: 34 cm              Placenta    Date/time: 10/31/2024 0056  Removal: Spontaneous  Appearance: Intact  Disposition: Pathology       Apgars    Living status: Living   Apgar Scoring Key:    0 1 2    Skin color Blue or pale Acrocyanotic Completely pink    Heart rate Absent <100 bpm >100 bpm     Reflex irritability No response Grimace Cry or active withdrawal    Muscle tone Limp Some flexion Active motion    Respiratory effort Absent Weak cry; hypoventilation Good, crying              1 Minute:  5 Minute:  10 Minute:  15 Minute:  20 Minute:      Skin color: 1  1       Heart rate: 2  2       Reflex irritablity: 2  2       Muscle tone: 2  2       Respiratory effort: 2  2       Total: 9  9          Apgars assigned by: ALEXSANDER PETERSEN RN  Orange disposition:  nursery       Skin to Skin    Skin to skin initiated date/time: 10/31/2024 0055  Skin to skin with: Mother       Vaginal Count    Initial count RN: Linda Currie RN  Initial count Tech: Darrianlauren Marilynjeremy Arevalo   Tawanas    Initial counts 10   0    Final counts 10   3    Final count RN: Idania Taylor RN  Final count MD: Nabila Luna DO       Lacerations    Episiotomy: None  Perineal lacerations: 2nd Repaired?: Yes     Clitoral laceration?: Yes Repaired?: Yes   Quantitative blood loss (mL): 1685            Pastora is a 35yo G1 at 41w2d gestation who presented to the hospital for IOL for postdates. She underwent cervical ripening with misoprostol and Cook balloon, followed by augmentation of labor with pitocin and amniotomy. She progressed to complete and pushed for approximately 2.5hrs and delivered a vigorous female infant, 3320g, Apgars 9/9. There was significant bleeding prior to placental separation which improved with bimanual massage. Placenta delivered spontaneously, found to be intact with 3 vessel cord. Uterine tone was inadequate and the patient was found to be febrile to 101.1F so the diagnosis of chorioamnionitis was made. Uterine tone did not improve with bimanual massage and high dose pitocin so hemabate was given with good response. A 2nd degree perineal laceration was repaired with 2-0 vicryl. A periclitoral laceration was found to be bleeding and was repaired with 3-0 vicryl in a single stitch. QBL 1685mL. Mother and  infant stable to recovery.     Nabila Luna, DO

## 2024-11-01 VITALS
HEART RATE: 82 BPM | OXYGEN SATURATION: 96 % | DIASTOLIC BLOOD PRESSURE: 70 MMHG | RESPIRATION RATE: 16 BRPM | WEIGHT: 178 LBS | BODY MASS INDEX: 28 KG/M2 | TEMPERATURE: 98 F | SYSTOLIC BLOOD PRESSURE: 127 MMHG

## 2024-11-01 LAB
BASOPHILS # BLD AUTO: 0.05 X10(3) UL (ref 0–0.2)
BASOPHILS NFR BLD AUTO: 0.4 %
DEPRECATED RDW RBC AUTO: 46.1 FL (ref 35.1–46.3)
EOSINOPHIL # BLD AUTO: 0.19 X10(3) UL (ref 0–0.7)
EOSINOPHIL NFR BLD AUTO: 1.7 %
ERYTHROCYTE [DISTWIDTH] IN BLOOD BY AUTOMATED COUNT: 13 % (ref 11–15)
HCT VFR BLD AUTO: 22.7 %
HGB BLD-MCNC: 8 G/DL
IMM GRANULOCYTES # BLD AUTO: 0.1 X10(3) UL (ref 0–1)
IMM GRANULOCYTES NFR BLD: 0.9 %
LYMPHOCYTES # BLD AUTO: 2.35 X10(3) UL (ref 1–4)
LYMPHOCYTES NFR BLD AUTO: 20.6 %
MCH RBC QN AUTO: 34.2 PG (ref 26–34)
MCHC RBC AUTO-ENTMCNC: 35.2 G/DL (ref 31–37)
MCV RBC AUTO: 97 FL
MONOCYTES # BLD AUTO: 0.91 X10(3) UL (ref 0.1–1)
MONOCYTES NFR BLD AUTO: 8 %
NEUTROPHILS # BLD AUTO: 7.81 X10 (3) UL (ref 1.5–7.7)
NEUTROPHILS # BLD AUTO: 7.81 X10(3) UL (ref 1.5–7.7)
NEUTROPHILS NFR BLD AUTO: 68.4 %
PLATELET # BLD AUTO: 121 10(3)UL (ref 150–450)
RBC # BLD AUTO: 2.34 X10(6)UL
WBC # BLD AUTO: 11.4 X10(3) UL (ref 4–11)

## 2024-11-01 RX ORDER — IBUPROFEN 600 MG/1
600 TABLET, FILM COATED ORAL EVERY 6 HOURS
Qty: 40 TABLET | Refills: 0 | Status: SHIPPED | OUTPATIENT
Start: 2024-11-01

## 2024-11-01 RX ORDER — FERROUS SULFATE 325(65) MG
325 TABLET, DELAYED RELEASE (ENTERIC COATED) ORAL 2 TIMES DAILY
Qty: 240 TABLET | Refills: 0 | Status: SHIPPED | OUTPATIENT
Start: 2024-11-01

## 2024-11-01 RX ORDER — PSEUDOEPHEDRINE HCL 30 MG
100 TABLET ORAL 2 TIMES DAILY PRN
Qty: 20 CAPSULE | Refills: 0 | Status: SHIPPED | OUTPATIENT
Start: 2024-11-01

## 2024-11-01 RX ORDER — ACETAMINOPHEN 500 MG
1000 TABLET ORAL EVERY 6 HOURS PRN
Qty: 40 TABLET | Refills: 0 | Status: SHIPPED | OUTPATIENT
Start: 2024-11-01

## 2024-11-01 NOTE — PLAN OF CARE

## 2024-11-01 NOTE — LACTATION NOTE
11/01/24 1025   Evaluation Type   Evaluation Type Inpatient   Problems identified   Problems identified Knowledge deficit   Problems Identified Other Blood loss 1685ml   Maternal history   Maternal history Induction of labor   Other/comment post dates   Breastfeeding goal   Breastfeeding goal To maintain breast milk feeding per patient goal   Maternal Assessment   Bilateral Breasts Soft;Wide spaced   Bilateral Nipples Colostrum easily expressed;Elastic;Everted   Prior breastfeeding experience (comment below) Primip   Breastfeeding Assistance Breastfeeding assistance provided with permission;Breast exam provided with permission;Hand expression provided with permission   Pain assessment   Location/Comment denies   Treatment of Sore Nipples Lanolin;Deeper latch techniques;Expressed breast milk;Hydrogel dressings as directed   Guidelines for use of:   Equipment Hydrogel dressings   Breast pump type Spectra   Current use of pump: not indicated at this time   Suggested use of pump For comfort as needed;Pump if infant is not latching to breast   Other (comment) Patient called LC to room.  LC observed mother bringing baby to the left breast in football hold. Minor adjustments made to bring baby higher and further back. Deep latch achieved. Nutritive sucking pattern noted with audible swallows. No pain reported by mother. Baby needed some stimulation to continue but otherwise had a good sustained latch. LC reviewed signs and symptoms of a nutritive feed and feeding patterns of a baby over 24 hours old. All questions answered.

## 2024-11-01 NOTE — DISCHARGE SUMMARY
[facility]  part of [facility]    Discharge Summary    Pastora Erickson Patient Status:  Inpatient    1990 MRN [id number]   Location [facility] 3SE Attending Nabila Luna DO   Hosp Day # 3 PCP No primary care provider on file.     Date of Admission: 10/29/2024    Date of Discharge: 24     Admission Diagnoses: pregnancy  Pregnant (HCC) at 41w2d     Secondary Diagnosis: postpartum anemia     Primary OB Clinician: [facility] Course:     EDC: Estimated Date of Delivery: 10/22/24    Gestational Age: 41w2d    Date of Delivery: 10/31/24    Antepartum complications: none    Delivered By: Nabila Luna DO    Delivery Type:     Tubal Ligation: n/a    Baby: Liveborn female,     Apgars:  1 minute:   9                 5 minutes: 9                        10 minutes:      Anesthesia: epidural      Surgical Procedures       Case IDs Date Procedure Surgeon Location Status    [id number] 10/31/24  SECTION Nabila Luna DO [facility] L+D OR [facility]            Intrapartum Complications: Chorio and Postpartum Hemorrhage, gestational HTN    Laceration: 2nd degree    Episiotomy: none    Placenta: spontaneous    Feeding Method: breast fed    Rh Immune Globulin Given: no    Rubella Vaccine Given: no        Discharge Plan:   Discharge Condition: Good  Early Discharge:  NO    Discharge medications:  Current Discharge Medication List        Home Meds - Unchanged    Details   Prenatal Vit-Fe Fumarate-FA (PRENATAL 19 OR) Take by mouth.      diphenhydrAMINE-APAP, sleep, (TYLENOL PM EXTRA STRENGTH)  MG Oral Tab Take by mouth.      VYVANSE 20 MG Oral Cap       Multiple Vitamins-Minerals (MULTIVITAMIN ADULT OR) Take by mouth.                   Discharge Diet: As tolerated and General diet    Discharge Activity: Pelvic rest until cleared    Follow up:      Follow-up Information       [facility]. Call.    Specialty: Pediatrics  Why: As needed  Contact  information:  [address]  [facility] [address]  [phone]  Additional information:  Masks are optional for all patients and visitors, unless otherwise indicated.             Nabila Luna DO Follow up.    Specialty: OBSTETRICS & GYNECOLOGY  Contact information:  [address]  [address]  [facility] [id number] [phone]               Nabila Luna DO. Schedule an appointment as soon as possible for a visit in 6 week(s).    Specialty: OBSTETRICS & GYNECOLOGY  Why: postpartum visit and IUD insertion  Contact information:  [address]  [address]  [facility] [address]  [phone]                             Follow up Labs: None        Other Discharge Instructions:         Pelvic rest for 6 weeks. No sex, tampons, nothing in the vagina. No sex, tampons, hot tubs or jacuzzis.    Call Md for any questions or concerns including: temp over 100.4, increased pain, increased bleeding or any signs of postpartum depression.     [facility] has great support for our families even after discharge.  We have virtual or in-person support groups.  Visit our website for the most up-to-date info for our many different support groups. [email or web address]       Outpatient Lactation appoints.  Call [phone]- to schedule an appt.  Our office is located in the Maternal Fetal Medicine office next to [facility] on the first floor.      New Moms Support Groups  Our weekly New Mom Support Groups are for any new parents in our community. They are led by an experienced Mother/Baby nurse or IBCLC and usually include a guest speaker on a topic of interest to new parents. These in-person groups also include Breastfeeding Support at each meeting. Bring your baby ( - 6 months) with you! Moms-to-be are also welcome! All mom's welcome even if its not your first.     MOM & BABY HOUR   Meets most  10:00 - 11:30 a.m.  Masks are not required, but be considerate of  others and do not attend if mom or baby have had any symptoms of illness within the previous 24 hours. Breastfeeding support will be included at each session--just ask the leader any breastfeeding questions you may have. Location [facility] [place] [address], [place] Go inside the front door and to the right to the “Community Education Room”.    Mom's Line: [phone]   This service is provided by [facility], has a phone line dedicated for women (or anyone worried about a women) who may be experiencing signs or symptoms of postpartum depression.    Nurturing Mom- A support group for new and expectant moms looking for support with the transition to parenthood as well as those experiencing symptoms of  anxiety and/or depression.  Please contact [email or web address] if you need directions or the link for the virtual meetings. Please contact [email or web address] if you plan to attend, but please be considerate of others and do not attend if mom or baby have had any symptoms of illness within the previous 24 hours.     La Leche League for breast feeding and parent support, Website: IIIus.org  and for the [place] group and other groups visit https://www.facebook.com/pg/Kaylin/events/.  to help find a group, all meetings are virtual.     Facebook groups-  for more support when home- [facility] --- you can find mom-to-mom advice and the list of speaker topics for cradle talk program.     Helpful websites:    www.llli.org  www.[employer]  www.Breastfeed[place].org    Post Vaginal Delivery Home Care Instructions     We hope you were pleased with your care at [facility].  We wish you the best outcome and overall experience with the delivery of your baby.  These instructions will help to minimize pain, limit the risk for an infection, and improve the likelihood of a successful  recovery.    What to Expect:  Abdominal cramping after delivery especially if you are breastfeeding.   Vaginal bleeding for about 4-6 weeks that may be followed by a yellow or white discharge for a few more weeks.  Your period will resume in approximately 6-8 weeks, unless you are breastfeeding.    If you are bottle feeding, you may notice breast engorgement in about 3 days.  Your breast may be sore and hard. Please wear a tight fitted bra or sports bra for 24-36 hours to help prevent your breast from producing milk, and use ice packs to relive any discomfort.  If you are breastfeeding, nipple dryness is very common the first few days.    Constipation is common after having a baby.  Please increase fluid and fiber in your diet.      Over-The-Counter Medication  Non-prescription anti-inflammatory medications can also help to ease the pain.  You may take Aleve, Tylenol or Ibuprofen   Colace or Metamucil for Constipation  Lanolin for dry nipples  Tucks, Witch Hazel and Epifoam for vaginal/perineum discomfort.   Drink a full glass of water with oral medication and take as directed.    Wound Care  The following instructions will promote proper healing and help to prevent infection  Vaginal/perineum Care: Sitz Bath for 15mins, 2-3 times a day,    Bathing/Showers  You may resume showers  No baths, swimming, hot tubs until your post-partum visit    Home Medication  Resume your home medications as instructed    Diet  Resume your normal diet    Activity  Refrain from vaginal intercourse, vaginal suppositories, tampon use or douches until after your post-partum visit.  No exercising for 4 weeks  You may climb stairs minimally for the 1st week.    Do not do heavy housework for at least 2-3 weeks    Return to Work or School  You may return to work in approximately 6 weeks  Contact your obstetrician’s office, if you need a medical release. ([phone])    Driving  Avoid driving if you are taking narcotics for pain  relief.    Follow-up Appointment with Your Obstetrician  Call your obstetrician’s office today for an appointment in 4-6 weeks.    The number is [phone].  Verify your appointment date, day, time, and location.  At your 1st post-partum office visit:  Your progress will be evaluated, findings reviewed, and any additional concerns and instructions will be discussed.    Questions or Concerns  Call your obstetrician’s office if you experience the following:  Severe pain not controlled by pain medication  Foul smelling vaginal discharge  Heavy bleeding  Shortness of breath  Fever  Redness, increased swelling or drainage from your incision  Crying and periods of sadness that prevents you from caring for yourself and your baby  Burning sensation during urination or inability to urinate  Swelling, redness or abnormal warmth to your leg/calf  Please call ([phone]). If your call is made after office hours, a physician will be available to help you.  There is always a provider covering our patients.    Thank you for coming to [facility] to start your new family.  The nurses and the anesthesiologists try very hard to make sure you receive the best care possible.  Your trust in them as well as us is greatly appreciated.    Thanks so much,   [facility]          Corrina Crouch MD    [facility]

## 2024-11-01 NOTE — PLAN OF CARE
Problem: POSTPARTUM  Goal: Long Term Goal:Experiences normal postpartum course  Description: INTERVENTIONS:  - Assess and monitor vital signs and lab values.  - Assess fundus and lochia.  - Provide ice/sitz baths for perineum discomfort.  - Monitor healing of incision/episiotomy/laceration, and assess for signs and symptoms of infection and hematoma.  - Assess bladder function and monitor for bladder distention.  - Provide/instruct/assist with pericare as needed.  - Provide VTE prophylaxis as needed.  - Monitor bowel function.  - Encourage ambulation and provide assistance as needed.  - Assess and monitor emotional status and provide social service/psych resources as needed.  - Utilize standard precautions and use personal protective equipment as indicated. Ensure aseptic care of all intravenous lines and invasive tubes/drains.  - Obtain immunization and exposure to communicable diseases history.  11/1/2024 1727 by Mar Saldaña, RN  Outcome: Completed  11/1/2024 1008 by Mar Saldaña, RN  Outcome: Progressing     Problem: POSTPARTUM  Goal: Optimize infant feeding at the breast  Description: INTERVENTIONS:  - Initiate breast feeding within first hour after birth.   - Monitor effectiveness of current breast feeding efforts.  - Assess support systems available to mother/family.  - Identify cultural beliefs/practices regarding lactation, letdown techniques, maternal food preferences.  - Assess mother's knowledge and previous experience with breast feeding.  - Provide information as needed about early infant feeding cues (e.g., rooting, lip smacking, sucking fingers/hand) versus late cue of crying.  - Discuss/demonstrate breast feeding aids (e.g., infant sling, nursing footstool/pillows, and breast pumps).  - Encourage mother/other family members to express feelings/concerns, and actively listen.  - Educate father/SO about benefits of breast feeding and how to manage common lactation challenges.  -  Recommend avoidance of specific medications or substances incompatible with breast feeding.  - Assess and monitor for signs of nipple pain/trauma.  - Instruct and provide assistance with proper latch.  - Review techniques for milk expression (breast pumping) and storage of breast milk. Provide pumping equipment/supplies, instructions and assistance, as needed.  - Encourage rooming-in and breast feeding on demand.  - Encourage skin-to-skin contact.  - Provide LC support as needed.  - Assess for and manage engorgement.  - Provide breast feeding education handouts and information on community breast feeding support.   2024 by Mar Saldaña, RN  Outcome: Completed  2024 by Mar Saldaña RN  Outcome: Progressing     Problem: POSTPARTUM  Goal: Establishment of adequate milk supply with medication/procedure interruptions  Description: INTERVENTIONS:  - Review techniques for milk expression (breast pumping).   - Provide pumping equipment/supplies, instructions, and assistance until it is safe to breastfeed infant.  2024 by Mar Saldaña RN  Outcome: Completed  2024 by Mar Saldaña RN  Outcome: Progressing     Problem: POSTPARTUM  Goal: Appropriate maternal -  bonding  Description: INTERVENTIONS:  - Assess caregiver- interactions.  - Assess caregiver's emotional status and coping mechanisms.  - Encourage caregiver to participate in  daily care.  - Assess support systems available to mother/family.  - Provide /case management support as needed.  2024 by Mar Saldaña, RN  Outcome: Completed  2024 by Mar Saldaña RN  Outcome: Progressing

## 2024-11-01 NOTE — PROGRESS NOTES
[facility]  Obstetrics and Gynecology    OB/GYN: Postpartum Progress Note     SUBJECTIVE:  Patient is a 34 year old  female who is s/p . She is PPD# 1. Delivery complicated by chortioamnioitis, GHTN, postpartum hemorrhage.   Doing well. Noted no c /o. Denies fever, chills, N, V, chest pain and SOB. Bleeding has been stable. Voiding without difficulty.  Ambulating without difficulty. .     OBJECTIVE:  Vitals:    10/31/24 0830 10/31/24 1255 10/31/24 2240 24 0852   BP: 117/63 120/65 129/64 127/70   Pulse: 80 79 79 82   Resp:   16 16   Temp: 97.6 °F (36.4 °C) 98.1 °F (36.7 °C) 98.2 °F (36.8 °C) 98.1 °F (36.7 °C)   TempSrc: Oral Oral Oral Oral   SpO2:       Weight:           Physical Exam:    General:    alert, appears stated age, and cooperative   Lochia:  appropriate   Uterine Fundus:   firm at umbilicus   DVT Evaluation:  No evidence of DVT seen on physical exam.          Labs:  Recent Labs   Lab 24  0629   RBC 2.34*   HGB 8.0*   HCT 22.7*   MCV 97.0   MCH 34.2*   MCHC 35.2   RDW 13.0   NEPRELIM 7.81*   WBC 11.4*   .0*       ASSESSMENT/PLAN:  Patient is a 34 year old  female who is s/p   PPD# 1.   IAI - afebrile  Postpartum hemorrhage/Anemia - iron on discharge   GHTN - normotensive now    Doing well   Continue routine postpartum care  Vitals per routine   Encourage ambulation   Contraception: considering Mirena at postpartum visit  Plan for discharge to home possibly today.     Corirna Crouch MD    [facility]

## 2024-11-01 NOTE — DISCHARGE INSTRUCTIONS
Pelvic rest for 6 weeks. No sex, tampons, nothing in the vagina. No sex, tampons, hot tubs or jacuzzis.    Call Md for any questions or concerns including: temp over 100.4, increased pain, increased bleeding or any signs of postpartum depression.     [facility] has great support for our families even after discharge.  We have virtual or in-person support groups.  Visit our website for the most up-to-date info for our many different support groups. [email or web address]       Outpatient Lactation appoints.  Call [phone]- to schedule an appt.  Our office is located in the Maternal Fetal Medicine office next to [facility] on the first floor.      New Moms Support Groups  Our weekly New Mom Support Groups are for any new parents in our community. They are led by an experienced Mother/Baby nurse or IBCLC and usually include a guest speaker on a topic of interest to new parents. These in-person groups also include Breastfeeding Support at each meeting. Bring your baby ( - 6 months) with you! Moms-to-be are also welcome! All mom's welcome even if its not your first.     MOM & BABY HOUR   Meets most  10:00 - 11:30 a.m.  Masks are not required, but be considerate of others and do not attend if mom or baby have had any symptoms of illness within the previous 24 hours. Breastfeeding support will be included at each session--just ask the leader any breastfeeding questions you may have. Location [facility] [place] [address] [place] Go inside the front door and to the right to the “Community Education Room”.    Mom's Line: [phone]   This service is provided by [facility], has a phone line dedicated for women (or anyone worried about a women) who may be experiencing signs or symptoms of postpartum depression.    Nurturing Mom- A support group for new and expectant moms looking for  support with the transition to parenthood as well as those experiencing symptoms of  anxiety and/or depression.  Please contact @[email or web address] if you need directions or the link for the virtual meetings. Please contact @[email or web address] if you plan to attend, but please be considerate of others and do not attend if mom or baby have had any symptoms of illness within the previous 24 hours.     La Leche League for breast feeding and parent support, Website: IIIus.org  and for the [place] group and other groups visit [email or web address]/[email or web address]  to help find a group, all meetings are virtual.     Facebook groups-  for more support when home- [facility]ies & Momm[facility] --- you can find mom-to-mom advice and the list of speaker topics for cradle talk program.     Helpful websites:    www.[email or web address].Spinback  www.[employer]  [email or web address]    Post Vaginal Delivery Home Care Instructions     We hope you were pleased with your care at [facility].  We wish you the best outcome and overall experience with the delivery of your baby.  These instructions will help to minimize pain, limit the risk for an infection, and improve the likelihood of a successful recovery.    What to Expect:  Abdominal cramping after delivery especially if you are breastfeeding.   Vaginal bleeding for about 4-6 weeks that may be followed by a yellow or white discharge for a few more weeks.  Your period will resume in approximately 6-8 weeks, unless you are breastfeeding.    If you are bottle feeding, you may notice breast engorgement in about 3 days.  Your breast may be sore and hard. Please wear a tight fitted bra or sports bra for 24-36 hours to help prevent your breast from producing milk, and use ice packs to relive any discomfort.  If you are breastfeeding, nipple dryness is very common the first few days.    Constipation is common after having a baby.  Please increase  fluid and fiber in your diet.      Over-The-Counter Medication  Non-prescription anti-inflammatory medications can also help to ease the pain.  You may take Aleve, Tylenol or Ibuprofen   Colace or Metamucil for Constipation  Lanolin for dry nipples  Tucks, Witch Hazel and Epifoam for vaginal/perineum discomfort.   Drink a full glass of water with oral medication and take as directed.    Wound Care  The following instructions will promote proper healing and help to prevent infection  Vaginal/perineum Care: Sitz Bath for 15mins, 2-3 times a day,    Bathing/Showers  You may resume showers  No baths, swimming, hot tubs until your post-partum visit    Home Medication  Resume your home medications as instructed    Diet  Resume your normal diet    Activity  Refrain from vaginal intercourse, vaginal suppositories, tampon use or douches until after your post-partum visit.  No exercising for 4 weeks  You may climb stairs minimally for the 1st week.    Do not do heavy housework for at least 2-3 weeks    Return to Work or School  You may return to work in approximately 6 weeks  Contact your obstetrician’s office, if you need a medical release. ([phone])    Driving  Avoid driving if you are taking narcotics for pain relief.    Follow-up Appointment with Your Obstetrician  Call your obstetrician’s office today for an appointment in 4-6 weeks.    The number is [phone].  Verify your appointment date, day, time, and location.  At your 1st post-partum office visit:  Your progress will be evaluated, findings reviewed, and any additional concerns and instructions will be discussed.    Questions or Concerns  Call your obstetrician’s office if you experience the following:  Severe pain not controlled by pain medication  Foul smelling vaginal discharge  Heavy bleeding  Shortness of breath  Fever  Redness, increased swelling or drainage from your incision  Crying and periods of sadness that prevents you from caring for yourself  and your baby  Burning sensation during urination or inability to urinate  Swelling, redness or abnormal warmth to your leg/calf  Please call ([phone]). If your call is made after office hours, a physician will be available to help you.  There is always a provider covering our patients.    Thank you for coming to [facility] to start your new family.  The nurses and the anesthesiologists try very hard to make sure you receive the best care possible.  Your trust in them as well as us is greatly appreciated.    Thanks so much,   The Providers of [facility] Obstetrics [facility] Gynecology

## 2024-11-05 ENCOUNTER — TELEPHONE (OUTPATIENT)
Dept: OBGYN CLINIC | Facility: CLINIC | Age: 34
End: 2024-11-05

## 2024-11-05 NOTE — TELEPHONE ENCOUNTER
Called pt after reviewing her chart had delivered via  on 10/31/2024    HGB  12.0 - 16.0 g/dL 8.0 Low        PLT  150.0 - 450.0 10(3)uL 121.0 Low      ALT  10 - 49 U/L 13   AST  <34 U/L 26    Informed to go to ED closes to home due to c/o headache, blurry vision, shortness of breath and hand tingling.  Informed to call someone take her.  Pt agrees.    Called Dr. Lindsay informed and agrees.

## 2024-11-05 NOTE — TELEPHONE ENCOUNTER
Americans with Disabilities Act forms with valid outside authorization signed on 11/1/24 received via [employer] on 11/4/24. Logged for processing.

## 2024-11-05 NOTE — TELEPHONE ENCOUNTER
Incoming call from patient requesting to speak with RN. Patient is stating that she has had a headache for 7 hours and is experiencing blurred vision, shortness of breath and tingling in her right arm.     Please assist.

## 2024-11-06 NOTE — TELEPHONE ENCOUNTER
Incoming call from patient requesting a call back to speak with a nurse to discuss the next step for her preeclampsia  follow up .

## 2024-11-06 NOTE — TELEPHONE ENCOUNTER
Called pt states was admitted to [facility] for Pre-Eclampsia.  Informed when discharge to f/u at [facility] for nurse BP check and pt agrees.

## 2024-11-06 NOTE — TELEPHONE ENCOUNTER
RN spoke with pt. Pt is being discharged from [facility] after receiving mag for postpartum preeclampsia. Pt to come to OP office for BP check on 11/8. RN told pt to call if she has any issues before then. Pt verbalized understanding and agreed with plan of care.

## 2024-11-08 ENCOUNTER — NURSE ONLY (OUTPATIENT)
Dept: OBGYN CLINIC | Facility: CLINIC | Age: 34
End: 2024-11-08
Payer: COMMERCIAL

## 2024-11-08 VITALS — DIASTOLIC BLOOD PRESSURE: 64 MMHG | SYSTOLIC BLOOD PRESSURE: 124 MMHG

## 2024-11-08 NOTE — PROGRESS NOTES
Patient here for nurse visit for blood pressure check. Two patient identifiers verified with patient.      Date of delivery: 10/31/2024   via   Current medications: None    Symptoms:   Headache: no   Blurry vision/spots before eyes/vision changes: no   Right upper quadrant pain: no   Any other new symptoms: no      Blood pressure: 124/64    REPEAT Blood pressure (if applicable): No    Discussed with AIDEE Branch     Patient verbalized understanding and agrees with plan of care

## 2024-11-12 NOTE — TELEPHONE ENCOUNTER
Dr. Luna,    Please sign off on form if you agree to: Americans with Disabilities Act     -Signature page will be the first page scanned  -From your Inbasket, Highlight the patient and click Chart   -Double click the 11/5/24 Forms Completion telephone encounter  -Scroll down to the Media section   -Click the blue Hyperlink: Americans with Disabilities Act  11/12/24  -Click Acknowledge located in the top right ribbon/menu   -Drag the mouse into the blank space of the document and a + sign will appear. Left click to   electronically sign the document.  -Once signed, simply exit out of the screen and you signature will be saved.     Thank you,  Alesha

## 2024-11-13 NOTE — TELEPHONE ENCOUNTER
Forms faxed to [facility] at [phone], fax confirmation received. Your [employer] message sent to patient.

## 2024-11-17 PROBLEM — O36.8130 DECREASED FETAL MOVEMENTS IN THIRD TRIMESTER (HCC): Status: ACTIVE | Noted: 2024-11-17

## 2024-11-23 ENCOUNTER — TELEPHONE (OUTPATIENT)
Dept: OBGYN UNIT | Facility: HOSPITAL | Age: 34
End: 2024-11-23

## 2024-11-23 NOTE — PROGRESS NOTES
Reviewed self and infant care with mom, she verbalizes understanding of instruction reviewed. Encouraged to follow up with MD's as directed with questions/concerns.      Will send new moms information and PMAD information via [employer].  Pastora states mood changes with fatigue.  Pastora denies thoughts of harm to self or others.  Instructed to notify Dr. Lindsay's office if worsening of mood changes or other symptoms such as feelings of fright or panic, not coping with everyday situations, or not functioning normally.    Patient in agreement with plan of care.

## 2024-12-10 ENCOUNTER — TELEPHONE (OUTPATIENT)
Dept: PHYSICAL THERAPY | Facility: HOSPITAL | Age: 34
End: 2024-12-10

## 2024-12-11 ENCOUNTER — OFFICE VISIT (OUTPATIENT)
Dept: PHYSICAL THERAPY | Age: 34
End: 2024-12-11
Attending: OBSTETRICS & GYNECOLOGY
Payer: COMMERCIAL

## 2024-12-11 DIAGNOSIS — R10.2 PERINEAL PAIN: Primary | ICD-10-CM

## 2024-12-11 PROCEDURE — 97162 PT EVAL MOD COMPLEX 30 MIN: CPT

## 2024-12-11 PROCEDURE — 97530 THERAPEUTIC ACTIVITIES: CPT

## 2024-12-11 NOTE — PROGRESS NOTES
PELVIC FLOOR EVALUATION:   Referring Physician: Dr. Crouch  Diagnosis: Perineal pain (R10.2)       Date of order: 24 Date of Service: 2024     Patient verbally consented to be seen for PT evaluation and treatment  Precautions:  None, 6 weeks post partum, no symptoms, no restrictions    PATIENT SUMMARY   Pastora Erickson is a 34 year old female  who presents to therapy today with complaints of bulging when she walks and when she is voiding. She also has  issues with BM unless she supports herself. Pt states that she also has significant pain with sexual intercourse and daily activities like standing and climbing stars  She also has issues with leaking    Pt describes pain level: current 0/10. Worst pain :6/10 with bowel movement    How has your lifestyle/quality of life been changed because of this issue :  less walking, fearful of going to the bathroom, waiting on 6 weeks post partum clearance but pain noted with second trimester with sexual intercourse    History of condition/Previous treatments :pt states that her pain started during the second trimester, she had issues with completion after second trimester. No issues prior    Past medical history was reviewed with Pastora. Significant findings include   Past Medical History:    ADHD        Pastora describes prior level of function  was pain free no issues, she has cardio : running and weight lifting    Occupation/Activities: consultant    Pt goals include to be able to have strong PF , pain with BM and sexual intercourse.      Obstetrical/Gynecological history:    SURGERIES: 5 years, egg donor surgery/retrieval    Pregnant Now: No  Method of contraceptions : IUD   Last menstrual period:last January   Painful periods :No   Heavy periods :No     1/ Para 1, ages:  6 weeks breastfeeding/breast fed  Delivery methods: vaginal  Complications during pregnancy/delivery: prolonged labor, post hemorrhage, post eclampsia       Vaginal dryness :  No  Abdominal/Vaginal Pressure complaints:Yes    Organ falling out:Yes :better after 3rd week      URINARY HABITS    Stress Urinary Incontinence : Yes  Events associated with the onset of urinary complaints: laugh  Amount of leakage: moderate  Do you ever leak urine without knowing it?  No   No activity:    Yes    Urge Urinary Incontinence : No  Amount of leakage: NA   Triggers :  N/A     Urinary Frequency:    How often  increased frequency : every hr   Urine Stream: normal   Amount: good amount    Painful urination : No   Trouble emptying bladder: completely :No   Post void dribble: No   Urine Stop test: Yes   Hovering: Yes   Nocturia: No   Empty bladder just in case: Yes     ADDITIONAL INFO:    Pad use:  underwear   Change frequency: 2x  Fluid Intake: 1 gallon  Bladder irritants: coffee      BOWEL HABITS    Types of symptoms: Constipation   Frequency of bowel movements: every day; 1-2 days  Stool consistency: Tyrrell Stool Scale: 2 to 4  Do you strain with defecation: Yes   Laxative/Stool softener use: Yes, colace    SEXUAL HEALTH STATUS    History of Sexual Abuse: No   Sexual [place] Status: Yes   Pain with initial and/or deep or pain after sexual activity penetration:  waiting for clearance, pain with pregnancy,   Pain lasting pain after sexual activity :Yes  Orgasm status: able  to No   Pain with pelvic exam: Yes     The Marinoff Dyspareunia Scale (MDS) is a four-point scale that measures the pain limitations that may prevent someone from having sexual intercourse:   0: No limitations   1: Discomfort, but doesn't prevent intercourse   2: Frequently prevents intercourse   3: Completely prevents intercourse      Types of symptoms: stress incontinence and nocturia ;constipation; increased frequency, dyspareunia bulging sensation      ASSESSMENT      Pastora presents to physical therapy evaluation with primary c/o complaints of bulging when she walks and when she is voiding. She also has  issues with BM unless she  supports herself. Pt states that she also has significant pain with sexual intercourse and daily activities like standing and climbing stars  She also has issues with leaking.   Current reported symptoms include: tress incontinence and nocturia ;constipation; increased frequency, dyspareunia bulging sensation      Functional deficits include but are not limited to less walking, fearful of going to the bathroom, waiting on 6 weeks post partum clearance but pain noted with second trimester with sexual intercourse.      The results of the objective tests and measures show joint mobility, motor function, muscle performance, muscle endurance, range of motion, coordination, and balance  involving the lumbo pelvic hip area . Noted  WFL lumbar mobility, but pressure with hip extension  WFL hip strength except  hip extension. LAUREN noted to be WFL.decreased single leg eccentric control   PT will need to perform internal assessment next session to assess PFM function and will revise  goals as needed.She is awaiting MD clearance for 6 weeks follow up      Pt and PT discussed evaluation findings, pathology, POC and HEP.  Pt voiced understanding and performs HEP correctly without reported pain. Skilled Pelvic Physical Therapy is medically necessary to address the above impairments and reach functional goals.    OBJECTIVE:   Informed consent for internal pelvic evaluation given: No deferred with scheduled post partum ff up on 12/16/24      Posture : sway back and high hip hinge posture  Gait: Gait:  pt ambulates on level ground with normal mechanics    Range Of Motion  Lumbar AROM screen: WFL,some pressure reported on lumbar extension    Palpation:     Adductors :WNL   Greater trochanter/Gluteal area :WNL    Piriformis :WNL   Paravertebrals: minimal restriction     LE AROM screen: grossly WNL for B hip and knee major joints  WFL,pain free  Strength (MMT) 5/5 IZAIAH LE  grossly assessed except hip extension at 4/5    Transverse  Abdominis: 3/5      Diastasis Recti: (finger width depth while contracted)  Above Umbilicus: 0  Umbilicus: 1.5  Below Umbilicus: 0      FUNCTIONAL PERFORMANCE     Left Right Comments    Motor Control Motor Control    Double Leg Squat WNL WNL    Single Leg Squat Decreased Decreased    Single Leg Balance 30 sec 30 sec         PFDI-20: PFDI 20    Scores   POPDI 6:    37.5   CRAD 8:    15.63   GRIS 6:    41.67   Summary:    94.8         Today's Treatment and Response:     THERAACT X 1 x 15 mins    Patient education was provided on objective findings of external and internal evaluation and expectations with treatment outcomes. Educated on pelvic anatomy and function with diagrams and pelvic model, bladder normatives, adequate hydration levels, and diaphragmatic breathing for PNS activation and pelvic floor relaxation , POP strategies,   Diaphragmatic breathing is sitting/supine  DB with PFM coordination: external  Patient was instructed in and issued a HEP for: above, copies given    Charges: PT Marichuyal Low Complexity, theraactx1      Total Timed Treatment: 15 min     Total Treatment Time: 45 min     Based on clinical rationale and outcome measures, this evaluation involved Low Complexity decision making due to 1-2 personal factors/comorbidities, 3 body structures involved/activity limitations, and evolving symptoms as noted above  PLAN OF CARE:    Goals: (to be met in 8-10 visits)    Pt will be I with HEP,its progression and management of symptoms, biomechanical strategies and self correction of upright posturing and PFM facilitation to manage IAP with daily tasks  Patient will verbalize improved  understanding of  bladder/bowel habits ,bladder ,bladder retraining  and long term management  Patient will exhibit an increase ability  in isolated pelvic floor strength  with improved  PERF , with appropriate relaxation from  for improve pelvic brace /IAP management with ADLS  manage UI/ALIE and prevent leakage during  ADLS  Patient to report urinary frequency to every 2-4 hours to allow for independent ADLs  Pt will report ability to manage urge incontinence utilizing urge suppression techniques/bladder training 50% or better to minimize leakage and decrease voiding frequency for improved ADLS/work efficiency  Patient reports a reduction in ALIE 50% or better to decrease pad use from  to 0/ day to maintain skin integrity of vulva area.  Patient reports change in Cassia stool # 2-4 to #4 75% of the time with daily bowel moment without straining  prevention risk of progression of laxity     Pt will demonstrate biomechanical strategies for managing IAP for safe performance of  daily, recreational  and work tasks, 75% of the time to avoid incontinence issues, decreased risk of laxity progression  Pt will improve strength on BLE graded below 5/5 to at least half a grade or better for ease of managing IAP with daily tasks      Frequency / Duration: Patient will be seen for 1-2 x/week or a total of 8-10 visits over a 90 day period.  Treatment will include: Manual Therapy, Neuromuscular Re-education, Therapeutic Activities, Therapeutic Exercise, and Home Exercise Program instruction       Education or treatment limitation: None  Rehab Potential:good      Patient/Family/Caregiver was advised of these findings, precautions, and treatment options and has agreed to actively participate in planning and for this course of care.    Thank you for your referral. Please co-sign or sign and return this letter via fax as soon as possible to [phone]. If you have any questions, please contact me at Dept: [phone]    Sincerely,  Electronically signed by therapist: Inessa Damon,PT, DPT, CAPP-OB  Physician's certification required: Yes  I certify the need for these services furnished under this plan of treatment and while under my care.    X___________________________________________________ Date____________________    Certification From:  12/11/2024  To:3/11/2025

## 2024-12-12 NOTE — PROGRESS NOTES
POSTPARTUM VISIT     HPI:   Pastora Erickson is a 34 year old  female presenting for postpartum visit.     She is s/p  at 41w2d after IOL. Her postpartum course was complicated by re-admission for postpartum pre-eclampsia. She did not require any PO antihypertensives at discharge. She is asymptomatic today. Today she reports:     Pain: none  Bleeding: had mild bleeding last week   Infant feeding: breast and formula  Mood: no concerns     She does report leaking urine with valsalva. She leaks small amounts with coughing, laughing, sneezing. This is not particularly disruptive to her life. She also reports a feeling like something is bulging into the vagina with prolonged walking. She was previously referred to pelvic floor PT for perineal pain during pregnancy. This pain has resolved but she plans to pursue PT for urinary and pelvic floor strength issues going forward.    Medications (Active prior to today's visit):  Current Outpatient Medications   Medication Sig Dispense Refill    acetaminophen 500 MG Oral Tab Take 2 tablets (1,000 mg total) by mouth every 6 (six) hours as needed. 40 tablet 0    docusate sodium 100 MG Oral Cap Take 100 mg by mouth 2 (two) times daily as needed for constipation. 20 capsule 0    ibuprofen 600 MG Oral Tab Take 1 tablet (600 mg total) by mouth every 6 (six) hours. 40 tablet 0    ferrous sulfate 325 (65 FE) MG Oral Tab EC Take 1 tablet (325 mg total) by mouth in the morning and 1 tablet (325 mg total) before bedtime. 240 tablet 0    diphenhydrAMINE-APAP, sleep, (TYLENOL PM EXTRA STRENGTH)  MG Oral Tab Take by mouth.      VYVANSE 20 MG Oral Cap       Prenatal Vit-Fe Fumarate-FA (PRENATAL 19 OR) Take by mouth.      Multiple Vitamins-Minerals (MULTIVITAMIN ADULT OR) Take by mouth.         Allergies:  Allergies[1]    LMP 2024 (Approximate)     PHYSICAL EXAM:   GENERAL: Well developed, well nourished, in no apparent distress  ABDOMEN: Soft, non distended, non tender,  no masses  GYNE/:   External Genitalia: pinpoint tender area of pink granulation tissue at the posterior fourchette, cauterized with silver nitrate, otherwise normal vulva          Vagina: normal mucosa, no lesions, clear discharge, minimal descent of the anterior vaginal wall with valsalva, no apical descent, no rectocele, no urinary leakage, no obvious urethral hypermobility    Uterus: normal size, mobile, nontender                     Cervix: normal os, no lesions or bleeding                    R/V: normal perineum, no hemorrhoids  EXTREMITIES: nontender without edema        IUD insertion:  Risks, benefits and alternatives were discussed with the patient.  We discussed possible complications and risks, including but not limited to infection, bleeding, pelvic pain, expulsion, failure, uterine perforation and increased risk of ectopic should pregnancy occur.  Written and verbal consent were obtained prior to the procedure and can be found in the patient's record.  The patient's LMP was Patient's last menstrual period was 01/16/2024 (approximate). and a pregnancy test was performed and confirmed negative. Timeout was performed    Anesthesia: paracervical block using 1% plain lidocaine 10cc    The cervix was prepped with Betadine.  The cervix was stabilized with a single-tooth tenaculum.  The cervix allowed easy passage of the sound without  dilation.  The uterus was in midposition.  The uterus was sounded to 8cm.  The Mirena was gently inserted to the fundus of the uterus using standard technique.  The IUD strings were cut about 2 cm from the cervical os.    Mirena IUD Lot # [id number] Expiration 2/2027 NDC 61856-688-88    Patient status: The patient tolerated the procedure well.    Complications: none    Follow-up: in 4-6 weeks for string check.  Patient was advised to notify the office of any signs of infection including significant pelvic pain, abnormal vaginal discharge or fever.  Patient was also advised to seek  medical care or call the office if there is any heavy vaginal bleeding and to go to the ER if there was bleeding that required changing more than 1 pad per hour.  Patient may take Motrin 600 mg every 6 hours as needed for pain if there are no allergies or contraindications to taking Motrin, may use heating pad for cramping as well. Patient was advised the IUD would need to be replaced in 8 years.    ASSESSMENT/PLAN:       #Postpartum visit  -Pain: well-controlled  -Mood: no concerns  -Breastfeeding: breast and formula  -Contraception: Mirena IUD inserted as above    #Urinary incontinence   -per patient leakage is minimal and not disruptive  -plan for pelvic floor PT  -discussed treatment usually takes form of pelvic floor PT, pessary, surgery and also depends on plans for future fertility  -if symptoms unresolved with PT will refer to urogynecology     Follow up 4-6wk string check, can be virtual visit      Nabila Luna DO                 [1]   Allergies  Allergen Reactions    Penicillins RASH     rash

## 2024-12-16 ENCOUNTER — POSTPARTUM (OUTPATIENT)
Dept: OBGYN CLINIC | Facility: CLINIC | Age: 34
End: 2024-12-16
Payer: COMMERCIAL

## 2024-12-16 VITALS
WEIGHT: 152 LBS | HEIGHT: 67 IN | BODY MASS INDEX: 23.86 KG/M2 | DIASTOLIC BLOOD PRESSURE: 70 MMHG | SYSTOLIC BLOOD PRESSURE: 110 MMHG

## 2024-12-16 DIAGNOSIS — Z30.430 ENCOUNTER FOR INSERTION OF INTRAUTERINE CONTRACEPTIVE DEVICE (IUD): Primary | ICD-10-CM

## 2024-12-16 PROBLEM — O36.8130 DECREASED FETAL MOVEMENTS IN THIRD TRIMESTER (HCC): Status: RESOLVED | Noted: 2024-11-17 | Resolved: 2024-12-16

## 2024-12-16 PROBLEM — Z34.00: Status: RESOLVED | Noted: 2024-06-05 | Resolved: 2024-12-16

## 2024-12-16 PROBLEM — O48.0 POST-TERM PREGNANCY, 40-42 WEEKS OF GESTATION (HCC): Status: RESOLVED | Noted: 2024-10-23 | Resolved: 2024-12-16

## 2024-12-16 PROBLEM — Z34.90 PREGNANT (HCC): Status: RESOLVED | Noted: 2024-10-29 | Resolved: 2024-12-16

## 2024-12-16 PROBLEM — O28.3 ABNORMAL ULTRASONIC FINDING ON ANTENATAL SCREENING OF MOTHER, ANTEPARTUM: Status: RESOLVED | Noted: 2024-06-05 | Resolved: 2024-12-16

## 2024-12-16 LAB
CONTROL LINE PRESENT WITH A CLEAR BACKGROUND (YES/NO): YES YES/NO
KIT LOT #: NORMAL NUMERIC
PREGNANCY TEST, URINE: NEGATIVE

## 2024-12-19 ENCOUNTER — APPOINTMENT (OUTPATIENT)
Dept: PHYSICAL THERAPY | Age: 34
End: 2024-12-19
Attending: OBSTETRICS & GYNECOLOGY
Payer: COMMERCIAL

## 2025-01-02 ENCOUNTER — APPOINTMENT (OUTPATIENT)
Dept: PHYSICAL THERAPY | Age: 35
End: 2025-01-02
Attending: OBSTETRICS & GYNECOLOGY
Payer: COMMERCIAL

## 2025-01-08 ENCOUNTER — OFFICE VISIT (OUTPATIENT)
Dept: PHYSICAL THERAPY | Age: 35
End: 2025-01-08
Attending: OBSTETRICS & GYNECOLOGY
Payer: COMMERCIAL

## 2025-01-08 PROCEDURE — 97110 THERAPEUTIC EXERCISES: CPT

## 2025-01-08 PROCEDURE — 97140 MANUAL THERAPY 1/> REGIONS: CPT

## 2025-01-08 PROCEDURE — 97112 NEUROMUSCULAR REEDUCATION: CPT

## 2025-01-08 NOTE — PROGRESS NOTES
Dx:       Perineal pain (R10.2)           Insurance   [facility]           Authorized  # visits by insurance  :  8-10    Expiration date  of Authorization:3/11/25   Eval date/latest PN:12/11/24  Initial POC# of visits: 8-10           POC cert date : 3/11/25  Authorizing Physician: Dr. Crouch    Fall Risk: standard         Precautions: none         Subjective: pt states that she still experiencing ALIE. She had her post partum test. Pt states that she has sexual intercourse but not as enjoyable. She states that she has normal frequency,paying attention to urges.  PAIN LEVEL:0/10  Assessment:   The results of theinternal assess ment  show motor function, muscle performance, muscle endurance, range of motion, and coordination  involving the PFM  with  noted decreased strength, endurance and coordination. No increased tone  noted on PF at this time as noted below, areas specified \  Started some PFM  contractions and Tra stabilization working on precontractions and bracing    Informed consent for internal pelvic evaluation given: Yes      Objective:  Informed consent for internal pelvic evaluation given: Yes     Internal Examination     Pelvic clock assessment:WNL   in all planes      Perineal observation : WNL  Contract : decreased  Bear down ;WFL  Cough: descent    Mons pubis: WNL  Labia majora: WNL  Labia minora: WNL  Urethral meatus: WNL  Introitus: WNL  Perineal body: WNL      Internal Palpation Left Right Comments   Superficial Transverse perineal WNL WNL    Bulbocavernosus WNL WNL    Ischiocavernosus WNL WNL    Deep Transverse Perineal WNL WNL    Compress Urethrae/Spinc. Urethrovaginalis   WNL WNL    Pubococcygeus/Pubovaginalis WNL WNL    Iliococcygeus WNL WNL    Obturator internus WNL WNL    Coccygeus Not tested Not tested         Pelvic Floor Muscle strength: (PERF= Power/Endurance/Reps/Fast) MMT: 2+/10/1/7  Accessory Muscle Use: adductors      Tissue Laxity Test:     Anterior Wall: Min  Posterior Wall: WNL  Apical:  WNL         Goals:   goals addressed this day as noted above  Goals: (to be met in 8-10 visits)     Pt will be I with HEP,its progression and management of symptoms, biomechanical strategies and self correction of upright posturing and PFM facilitation to manage IAP with daily tasks  Patient will verbalize improved  understanding of  bladder/bowel habits ,bladder ,bladder retraining  and long term management  Patient will exhibit an increase ability  in isolated pelvic floor strength  with improved  PERF , with appropriate relaxation from  for improve pelvic brace /IAP management with ADLS  manage UI/ALIE and prevent leakage during ADLS  Patient to report urinary frequency to every 2-4 hours to allow for independent ADLs  Pt will report ability to manage urge incontinence utilizing urge suppression techniques/bladder training 50% or better to minimize leakage and decrease voiding frequency for improved ADLS/work efficiency  Patient reports a reduction in ALIE 50% or better to decrease pad use from  to 0/ day to maintain skin integrity of vulva area.  Patient reports change in [place] stool # 2-4 to #4 75% of the time with daily bowel moment without straining  prevention risk of progression of laxity     Pt will demonstrate biomechanical strategies for managing IAP for safe performance of  daily, recreational  and work tasks, 75% of the time to avoid incontinence issues, decreased risk of laxity progression  Pt will improve strength on BLE graded below 5/5 to at least half a grade or better for ease of managing IAP with daily tasks    Plan:cont per POC, will assess previous treatment and continue with strengthening with TRA combination     Date: 1/8/25  TX#: 2/8/10 Date:               TX#: 3/ Date:              TX#: 4/ Date:               TX#: 5/   Date:   Tx#: 6/  PFDI   Theraex: 12 mins  TRA with SLR x10  Supine hip adduction with ball 10 secs x10  Supine bridges x10  Prone hip extension x10  SL hip abd x10          Manual: X8 mins  Reasssed       NMRed:  see below X23 mins       INTERNAL    facilitation    PFM long hold x  secs x 3    PFM quick contraction x 10 secs     PFM with precontractions with  cough x3 reps     TRA facilitation x3 reps     PERF        EXTERNAL DB with PFM 3 secsx 2  PFM quick 10x  TrA in supine       Education: PT provided/reviewed education on pelvic anatomy and function with diagrams and pelvic model, coordination of diaphragmatic breathing and pelvic floor contraction , and knack/pelvic muscle brace with hand outs given        HEP See pt instructions tab for new HEP today See pt instructions tab for new HEP today See pt instructions tab for new HEP today See pt instructions tab for new HEP today See pt instructions tab for new HEP today        Charges: theraex x1, neuroreed x2, man mob x1       Total Timed Treatment: 43 min  Total Treatment Time: 43 min

## 2025-01-22 ENCOUNTER — APPOINTMENT (OUTPATIENT)
Dept: PHYSICAL THERAPY | Age: 35
End: 2025-01-22
Attending: OBSTETRICS & GYNECOLOGY
Payer: COMMERCIAL

## 2025-02-05 ENCOUNTER — APPOINTMENT (OUTPATIENT)
Dept: PHYSICAL THERAPY | Age: 35
End: 2025-02-05
Attending: OBSTETRICS & GYNECOLOGY
Payer: COMMERCIAL

## 2025-02-12 ENCOUNTER — APPOINTMENT (OUTPATIENT)
Dept: PHYSICAL THERAPY | Age: 35
End: 2025-02-12
Attending: OBSTETRICS & GYNECOLOGY
Payer: COMMERCIAL

## 2025-02-19 ENCOUNTER — APPOINTMENT (OUTPATIENT)
Dept: PHYSICAL THERAPY | Age: 35
End: 2025-02-19
Attending: OBSTETRICS & GYNECOLOGY
Payer: COMMERCIAL

## 2025-02-26 ENCOUNTER — APPOINTMENT (OUTPATIENT)
Dept: PHYSICAL THERAPY | Age: 35
End: 2025-02-26
Attending: OBSTETRICS & GYNECOLOGY
Payer: COMMERCIAL

## (undated) NOTE — LETTER
02/28/24    To Whom it May Concern,    Pastora Erickson is currently pregnant and under my care.  We have discussed the use of Vyvanse to treat ADHD during pregnancy, because she has been struggling to focus in her day-to-day life since stopping this medication.  We discussed that Vyvanse crosses the placenta and might increase risk of pre-term birth and low birth weight. 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 An example of such a plan could be vyvanse 2 days per week.    Please let me know if you have questions or concerns.  Thank you for your time,      Oxana Lindsay, DO

## (undated) NOTE — LETTER
Pastora EricksonDOB:1990    CONSENT FOR PROCEDURE/SEDATION    1. I authorize the performance upon Pastora Erickson  the following: IUD Insertion    2. I authorize Dr. Nabila Luna DO (and whomever is designated as the doctor’s assistant), to perform the above-mentioned procedures.    3. If any unforeseen conditions arise during this procedure calling for additional  procedures, operations, or medications (including anesthesia and blood transfusion), I further request and authorize the doctor to do whatever he/she deems advisable in my interest.    4. I consent to the taking and reproduction of any photographs in the course of this procedure for professional purposes.    5. I consent to the administration of such sedation as may be considered necessary or advisable by the physician responsible for this service, with the exception of ______________________________________________________    6. I have been informed by my doctor of the nature and purpose of this procedure sedation, possible alternative methods of treatment, risk involved and possible complications.    Signature of Patient:_______________________________________________    Signature of person authorized to consent for patient:  _______________________________________________________________    Relationship to patient: ____________________________________________    Witness: _________________________________________ Date:___________     Physician Signature: _______________________________ Date:___________

## (undated) NOTE — LETTER
[facility]  Administration of Anesthesia  I, Pastora Erickson agree to be cared for by a physician anesthesiologist alone and/or with a nurse anesthetist, who is specially trained to monitor me and give me medicine to put me to sleep or keep me comfortable during my procedure    I understand that my anesthesiologist and/or anesthetist is not an employee or agent of [facility] or [employer]. He or she works for [facility]    As the patient asking for anesthesia services, I agree to:  Allow the anesthesiologist (anesthesia doctor) to give me medicine and do additional procedures as necessary. Some examples are: Starting or using an “IV” to give me medicine, fluids or blood during my procedure, and having a breathing tube placed to help me breathe when I’m asleep (intubation). In the event that my heart stops working properly, I understand that my anesthesiologist will make every effort to sustain my life, unless otherwise directed by [facility] Do Not Resuscitate documents.  Tell my anesthesia doctor before my procedure:  If I am pregnant.  The last time that I ate or drank.  iii. All of the medicines I take (including prescriptions, herbal supplements, and pills I can buy without a prescription (including street drugs/illegal medications). Failure to inform my anesthesiologist about these medicines may increase my risk of anesthetic complications.  iv.If I am allergic to anything or have had a reaction to anesthesia before.  I understand how the anesthesia medicine will help me (benefits).  I understand that with any type of anesthesia medicine there are risks:  The most common risks are: nausea, vomiting, sore throat, muscle soreness, damage to my eyes, mouth, or teeth (from breathing tube placement).  Rare risks include: remembering what happened during my procedure, allergic reactions to medications, injury to my airway, heart, lungs, vision, nerves, or  muscles and in extremely rare instances death.  My doctor has explained to me other choices available to me for my care (alternatives).  Pregnant Patients (“epidural”):  I understand that the risks of having an epidural (medicine given into my back to help control pain during labor), include itching, low blood pressure, difficulty urinating, headache or slowing of the baby’s heart. Very rare risks include infection, bleeding, seizure, irregular heart rhythms and nerve injury.  Regional Anesthesia (“spinal”, “epidural”, & “nerve blocks”):  I understand that rare but potential complications include headache, bleeding, infection, seizure, irregular heart rhythms, and nerve injury.    _____________________________________________________________________________  Patient (or Representative) Signature/Relationship to Patient  Date   Time    _____________________________________________________________________________   Name (if used)    Language/Organization   Time    _____________________________________________________________________________  Nurse Anesthetist Signature     Date   Time  _____________________________________________________________________________  Anesthesiologist Signature     Date   Time  I have discussed the procedure and information above with the patient (or patient’s representative) and answered their questions. The patient or their representative has agreed to have anesthesia services.    _____________________________________________________________________________  Witness        Date   Time  I have verified that the signature is that of the patient or patient’s representative, and that it was signed before the procedure  Patient Name: Pastora Erickson     : 1990                 Printed: 10/29/2024 at 10:53 PM    Medical Record #: [id number]                                            Page 1 of 1  ----------ANESTHESIA CONSENT----------